# Patient Record
Sex: MALE | Race: WHITE | NOT HISPANIC OR LATINO | Employment: OTHER | URBAN - METROPOLITAN AREA
[De-identification: names, ages, dates, MRNs, and addresses within clinical notes are randomized per-mention and may not be internally consistent; named-entity substitution may affect disease eponyms.]

---

## 2023-08-26 PROBLEM — I25.10 CORONARY ATHEROSCLEROSIS: Status: ACTIVE | Noted: 2023-08-26

## 2023-08-26 PROBLEM — C44.41 BASAL CELL CARCINOMA OF SKIN OF SCALP AND NECK: Status: ACTIVE | Noted: 2019-11-15

## 2023-08-26 PROBLEM — E78.5 HYPERLIPIDEMIA: Status: ACTIVE | Noted: 2023-08-26

## 2023-08-26 PROBLEM — R07.9 CHEST PAIN: Status: ACTIVE | Noted: 2023-08-26

## 2023-08-26 PROBLEM — C44.310 BASAL CELL CARCINOMA OF SKIN OF UNSPECIFIED PARTS OF FACE: Status: ACTIVE | Noted: 2019-11-15

## 2023-08-26 PROBLEM — R93.1 ELEVATED CORONARY ARTERY CALCIUM SCORE: Status: ACTIVE | Noted: 2023-08-26

## 2023-08-26 PROBLEM — N52.9 ERECTILE DYSFUNCTION: Status: ACTIVE | Noted: 2023-08-26

## 2023-08-26 PROBLEM — M72.2 PLANTAR FASCIITIS OF LEFT FOOT: Status: ACTIVE | Noted: 2023-08-26

## 2023-08-26 PROBLEM — D86.9 SARCOIDOSIS: Status: ACTIVE | Noted: 2023-08-26

## 2023-08-26 PROBLEM — B02.29 POSTHERPETIC NEURALGIA: Status: ACTIVE | Noted: 2023-08-26

## 2023-08-26 PROBLEM — I77.810 AORTIC ROOT DILATATION (CMS-HCC): Status: ACTIVE | Noted: 2023-08-26

## 2023-08-26 RX ORDER — PREGABALIN 150 MG/1
1 CAPSULE ORAL 2 TIMES DAILY
COMMUNITY
Start: 2021-09-15 | End: 2023-12-29 | Stop reason: SDUPTHER

## 2023-08-26 RX ORDER — CHOLECALCIFEROL (VITAMIN D3) 25 MCG
1 TABLET ORAL DAILY
COMMUNITY
Start: 2018-06-20

## 2023-08-26 RX ORDER — VALACYCLOVIR HYDROCHLORIDE 1 G/1
1000 TABLET, FILM COATED ORAL 3 TIMES DAILY
COMMUNITY

## 2023-08-26 RX ORDER — MULTIVIT-MIN/IRON/FOLIC ACID/K 18-600-40
2 CAPSULE ORAL
COMMUNITY

## 2023-08-26 RX ORDER — ASPIRIN 81 MG/1
1 TABLET ORAL DAILY
COMMUNITY

## 2023-08-26 RX ORDER — SILDENAFIL 50 MG/1
1 TABLET, FILM COATED ORAL DAILY PRN
COMMUNITY
Start: 2016-03-25

## 2023-08-26 RX ORDER — ROSUVASTATIN CALCIUM 40 MG/1
1 TABLET, COATED ORAL NIGHTLY
COMMUNITY
Start: 2021-07-07 | End: 2023-10-03 | Stop reason: SDUPTHER

## 2023-10-03 DIAGNOSIS — E78.5 HYPERLIPIDEMIA, UNSPECIFIED HYPERLIPIDEMIA TYPE: Primary | ICD-10-CM

## 2023-10-03 RX ORDER — ROSUVASTATIN CALCIUM 40 MG/1
40 TABLET, COATED ORAL NIGHTLY
Qty: 90 TABLET | Refills: 3 | Status: SHIPPED | OUTPATIENT
Start: 2023-10-03

## 2023-10-04 ENCOUNTER — OFFICE VISIT (OUTPATIENT)
Dept: PAIN MEDICINE | Facility: CLINIC | Age: 68
End: 2023-10-04
Payer: MEDICARE

## 2023-10-04 VITALS
BODY MASS INDEX: 25.87 KG/M2 | WEIGHT: 191 LBS | SYSTOLIC BLOOD PRESSURE: 125 MMHG | HEIGHT: 72 IN | TEMPERATURE: 97.6 F | HEART RATE: 51 BPM | DIASTOLIC BLOOD PRESSURE: 72 MMHG | OXYGEN SATURATION: 99 % | RESPIRATION RATE: 16 BRPM

## 2023-10-04 DIAGNOSIS — B02.29 POSTHERPETIC NEURALGIA: Primary | ICD-10-CM

## 2023-10-04 PROCEDURE — 1159F MED LIST DOCD IN RCRD: CPT | Performed by: ANESTHESIOLOGY

## 2023-10-04 PROCEDURE — 99214 OFFICE O/P EST MOD 30 MIN: CPT | Performed by: ANESTHESIOLOGY

## 2023-10-04 PROCEDURE — 1036F TOBACCO NON-USER: CPT | Performed by: ANESTHESIOLOGY

## 2023-10-04 PROCEDURE — 1125F AMNT PAIN NOTED PAIN PRSNT: CPT | Performed by: ANESTHESIOLOGY

## 2023-10-04 RX ORDER — ACYCLOVIR 800 MG/1
800 TABLET ORAL 4 TIMES DAILY
Status: SHIPPED | OUTPATIENT
Start: 2023-10-04 | End: 2023-10-19

## 2023-10-04 ASSESSMENT — PATIENT HEALTH QUESTIONNAIRE - PHQ9
2. FEELING DOWN, DEPRESSED OR HOPELESS: NOT AT ALL
SUM OF ALL RESPONSES TO PHQ9 QUESTIONS 1 AND 2: 0
1. LITTLE INTEREST OR PLEASURE IN DOING THINGS: NOT AT ALL

## 2023-10-04 ASSESSMENT — ENCOUNTER SYMPTOMS
CARDIOVASCULAR NEGATIVE: 1
EYES NEGATIVE: 1
OCCASIONAL FEELINGS OF UNSTEADINESS: 0
DEPRESSION: 0
NEUROLOGICAL NEGATIVE: 1
GASTROINTESTINAL NEGATIVE: 1
RESPIRATORY NEGATIVE: 1
HEMATOLOGIC/LYMPHATIC NEGATIVE: 1
ENDOCRINE NEGATIVE: 1
PSYCHIATRIC NEGATIVE: 1
LOSS OF SENSATION IN FEET: 0

## 2023-10-04 ASSESSMENT — PAIN SCALES - GENERAL
PAINLEVEL_OUTOF10: 4
PAINLEVEL: 4

## 2023-10-04 ASSESSMENT — PAIN DESCRIPTION - DESCRIPTORS: DESCRIPTORS: SHARP;BURNING

## 2023-10-04 ASSESSMENT — COLUMBIA-SUICIDE SEVERITY RATING SCALE - C-SSRS
2. HAVE YOU ACTUALLY HAD ANY THOUGHTS OF KILLING YOURSELF?: NO
1. IN THE PAST MONTH, HAVE YOU WISHED YOU WERE DEAD OR WISHED YOU COULD GO TO SLEEP AND NOT WAKE UP?: NO
6. HAVE YOU EVER DONE ANYTHING, STARTED TO DO ANYTHING, OR PREPARED TO DO ANYTHING TO END YOUR LIFE?: NO

## 2023-10-04 ASSESSMENT — LIFESTYLE VARIABLES: TOTAL SCORE: 0

## 2023-10-04 ASSESSMENT — PAIN - FUNCTIONAL ASSESSMENT: PAIN_FUNCTIONAL_ASSESSMENT: 0-10

## 2023-10-10 DIAGNOSIS — N40.1 BENIGN PROSTATIC HYPERPLASIA WITH URINARY FREQUENCY: ICD-10-CM

## 2023-10-10 DIAGNOSIS — R35.0 BENIGN PROSTATIC HYPERPLASIA WITH URINARY FREQUENCY: ICD-10-CM

## 2023-10-10 DIAGNOSIS — E78.5 HYPERLIPIDEMIA, UNSPECIFIED HYPERLIPIDEMIA TYPE: Primary | ICD-10-CM

## 2023-10-10 DIAGNOSIS — E55.9 VITAMIN D DEFICIENCY: ICD-10-CM

## 2023-11-20 ENCOUNTER — LAB (OUTPATIENT)
Dept: LAB | Facility: LAB | Age: 68
End: 2023-11-20
Payer: MEDICARE

## 2023-11-20 DIAGNOSIS — R35.0 BENIGN PROSTATIC HYPERPLASIA WITH URINARY FREQUENCY: ICD-10-CM

## 2023-11-20 DIAGNOSIS — N40.1 BENIGN PROSTATIC HYPERPLASIA WITH URINARY FREQUENCY: ICD-10-CM

## 2023-11-20 DIAGNOSIS — E55.9 VITAMIN D DEFICIENCY: ICD-10-CM

## 2023-11-20 DIAGNOSIS — E78.5 HYPERLIPIDEMIA, UNSPECIFIED HYPERLIPIDEMIA TYPE: ICD-10-CM

## 2023-11-20 LAB
25(OH)D3 SERPL-MCNC: 32 NG/ML (ref 30–100)
ALBUMIN SERPL BCP-MCNC: 4.2 G/DL (ref 3.4–5)
ALP SERPL-CCNC: 46 U/L (ref 33–136)
ALT SERPL W P-5'-P-CCNC: 16 U/L (ref 10–52)
ANION GAP SERPL CALC-SCNC: 11 MMOL/L (ref 10–20)
APPEARANCE UR: CLEAR
AST SERPL W P-5'-P-CCNC: 18 U/L (ref 9–39)
BILIRUB SERPL-MCNC: 0.7 MG/DL (ref 0–1.2)
BILIRUB UR STRIP.AUTO-MCNC: NEGATIVE MG/DL
BUN SERPL-MCNC: 11 MG/DL (ref 6–23)
CALCIUM SERPL-MCNC: 9.7 MG/DL (ref 8.6–10.6)
CHLORIDE SERPL-SCNC: 105 MMOL/L (ref 98–107)
CHOLEST SERPL-MCNC: 137 MG/DL (ref 0–199)
CHOLESTEROL/HDL RATIO: 2.1
CO2 SERPL-SCNC: 30 MMOL/L (ref 21–32)
COLOR UR: YELLOW
CREAT SERPL-MCNC: 0.84 MG/DL (ref 0.5–1.3)
ERYTHROCYTE [DISTWIDTH] IN BLOOD BY AUTOMATED COUNT: 13.5 % (ref 11.5–14.5)
GFR SERPL CREATININE-BSD FRML MDRD: >90 ML/MIN/1.73M*2
GLUCOSE SERPL-MCNC: 83 MG/DL (ref 74–99)
GLUCOSE UR STRIP.AUTO-MCNC: NEGATIVE MG/DL
HCT VFR BLD AUTO: 42.9 % (ref 41–52)
HDLC SERPL-MCNC: 65.4 MG/DL
HGB BLD-MCNC: 13.9 G/DL (ref 13.5–17.5)
HOLD SPECIMEN: NORMAL
KETONES UR STRIP.AUTO-MCNC: NEGATIVE MG/DL
LDLC SERPL CALC-MCNC: 59 MG/DL
LEUKOCYTE ESTERASE UR QL STRIP.AUTO: NEGATIVE
MCH RBC QN AUTO: 30.1 PG (ref 26–34)
MCHC RBC AUTO-ENTMCNC: 32.4 G/DL (ref 32–36)
MCV RBC AUTO: 93 FL (ref 80–100)
NITRITE UR QL STRIP.AUTO: NEGATIVE
NON HDL CHOLESTEROL: 72 MG/DL (ref 0–149)
NRBC BLD-RTO: 0 /100 WBCS (ref 0–0)
PH UR STRIP.AUTO: 6 [PH]
PLATELET # BLD AUTO: 115 X10*3/UL (ref 150–450)
POTASSIUM SERPL-SCNC: 4.2 MMOL/L (ref 3.5–5.3)
PROT SERPL-MCNC: 6.7 G/DL (ref 6.4–8.2)
PROT UR STRIP.AUTO-MCNC: NEGATIVE MG/DL
PSA SERPL-MCNC: 1.06 NG/ML
RBC # BLD AUTO: 4.62 X10*6/UL (ref 4.5–5.9)
RBC # UR STRIP.AUTO: NEGATIVE /UL
SODIUM SERPL-SCNC: 142 MMOL/L (ref 136–145)
SP GR UR STRIP.AUTO: 1.01
TRIGL SERPL-MCNC: 62 MG/DL (ref 0–149)
TSH SERPL-ACNC: 3.17 MIU/L (ref 0.44–3.98)
UROBILINOGEN UR STRIP.AUTO-MCNC: <2 MG/DL
VLDL: 12 MG/DL (ref 0–40)
WBC # BLD AUTO: 5 X10*3/UL (ref 4.4–11.3)

## 2023-11-20 PROCEDURE — 80061 LIPID PANEL: CPT

## 2023-11-20 PROCEDURE — 84153 ASSAY OF PSA TOTAL: CPT

## 2023-11-20 PROCEDURE — 80053 COMPREHEN METABOLIC PANEL: CPT

## 2023-11-20 PROCEDURE — 81003 URINALYSIS AUTO W/O SCOPE: CPT

## 2023-11-20 PROCEDURE — 84443 ASSAY THYROID STIM HORMONE: CPT

## 2023-11-20 PROCEDURE — 36415 COLL VENOUS BLD VENIPUNCTURE: CPT

## 2023-11-20 PROCEDURE — 85027 COMPLETE CBC AUTOMATED: CPT

## 2023-11-20 PROCEDURE — 82306 VITAMIN D 25 HYDROXY: CPT

## 2023-11-27 ENCOUNTER — OFFICE VISIT (OUTPATIENT)
Dept: GASTROENTEROLOGY | Facility: CLINIC | Age: 68
End: 2023-11-27
Payer: MEDICARE

## 2023-11-27 VITALS — BODY MASS INDEX: 26.55 KG/M2 | HEART RATE: 50 BPM | WEIGHT: 196 LBS | OXYGEN SATURATION: 96 % | HEIGHT: 72 IN

## 2023-11-27 DIAGNOSIS — E78.00 PURE HYPERCHOLESTEROLEMIA: Primary | ICD-10-CM

## 2023-11-27 PROCEDURE — 1036F TOBACCO NON-USER: CPT | Performed by: INTERNAL MEDICINE

## 2023-11-27 PROCEDURE — 1159F MED LIST DOCD IN RCRD: CPT | Performed by: INTERNAL MEDICINE

## 2023-11-27 PROCEDURE — 1125F AMNT PAIN NOTED PAIN PRSNT: CPT | Performed by: INTERNAL MEDICINE

## 2023-11-27 PROCEDURE — 99214 OFFICE O/P EST MOD 30 MIN: CPT | Performed by: INTERNAL MEDICINE

## 2023-11-27 ASSESSMENT — ENCOUNTER SYMPTOMS
MUSCULOSKELETAL NEGATIVE: 1
HEMATOLOGIC/LYMPHATIC NEGATIVE: 1
NEUROLOGICAL NEGATIVE: 1
EYES NEGATIVE: 1
CARDIOVASCULAR NEGATIVE: 1
ENDOCRINE NEGATIVE: 1
CONSTITUTIONAL NEGATIVE: 1
GASTROINTESTINAL NEGATIVE: 1
RESPIRATORY NEGATIVE: 1
PSYCHIATRIC NEGATIVE: 1

## 2023-11-27 NOTE — PROGRESS NOTES
Subjective     History of Present Illness:   Jadiel Waggoner is a 68 y.o. male with PMHx of sarcoidosis  who presents to GI clinic for physical exam.     Review of Systems  Review of Systems   Constitutional: Negative.    HENT: Negative.     Eyes: Negative.  Negative for visual disturbance.   Respiratory: Negative.     Cardiovascular: Negative.    Gastrointestinal: Negative.    Endocrine: Negative.    Genitourinary: Negative.    Musculoskeletal: Negative.    Neurological: Negative.    Hematological: Negative.    Psychiatric/Behavioral: Negative.         Allergies  No Known Allergies    Medications  Current Outpatient Medications   Medication Instructions    ascorbic acid, vitamin C, 500 mg capsule 2 capsules, oral, Daily RT    aspirin 81 mg EC tablet 1 tablet, oral, Daily    cholecalciferol (Vitamin D-3) 25 MCG (1000 UT) tablet 1 tablet, oral, Daily    pregabalin (Lyrica) 150 mg capsule 1 capsule, oral, 2 times daily    rosuvastatin (CRESTOR) 40 mg, oral, Nightly    sildenafil (Viagra) 50 mg tablet 1 tablet, oral, Daily PRN, 1 HOUR BEFORE NEEDED    valACYclovir (VALTREX) 1,000 mg, oral, 3 times daily        Objective   Visit Vitals  Pulse 50    /74  Physical Exam  Constitutional:       Appearance: Normal appearance.   HENT:      Right Ear: Tympanic membrane normal.      Left Ear: Tympanic membrane normal.      Nose: Nose normal.      Mouth/Throat:      Mouth: Mucous membranes are moist.   Eyes:      Extraocular Movements: Extraocular movements intact.   Cardiovascular:      Rate and Rhythm: Normal rate and regular rhythm.      Pulses: Normal pulses.   Pulmonary:      Effort: Pulmonary effort is normal.      Breath sounds: Normal breath sounds.   Abdominal:      General: Abdomen is flat.      Palpations: Abdomen is soft.   Genitourinary:     Testes: Normal.      Prostate: Normal.      Rectum: Normal. Guaiac result negative.   Musculoskeletal:      Cervical back: Normal range of motion.   Neurological:       General: No focal deficit present.   Psychiatric:         Mood and Affect: Mood normal.           Lab Results   Component Value Date    WBC 5.0 11/20/2023    WBC 6.9 07/12/2022    WBC 5.1 11/10/2021    HGB 13.9 11/20/2023    HGB 15.1 07/12/2022    HGB 14.2 11/10/2021    HCT 42.9 11/20/2023    HCT 45.2 07/12/2022    HCT 44.1 11/10/2021     (L) 11/20/2023     (L) 07/12/2022     (L) 11/10/2021     Lab Results   Component Value Date     11/20/2023     11/28/2022     07/12/2022    K 4.2 11/20/2023    K 4.3 11/28/2022    K 4.0 07/12/2022     11/20/2023     11/28/2022     07/12/2022    CO2 30 11/20/2023    CO2 29 11/28/2022    CO2 25 07/12/2022    BUN 11 11/20/2023    BUN 13 11/28/2022    BUN 10 07/12/2022    CREATININE 0.84 11/20/2023    CREATININE 0.85 11/28/2022    CREATININE 0.82 07/12/2022    CALCIUM 9.7 11/20/2023    CALCIUM 9.7 11/28/2022    CALCIUM 9.6 07/12/2022    PROT 6.7 11/20/2023    PROT 6.8 11/28/2022    PROT 7.3 07/12/2022    BILITOT 0.7 11/20/2023    BILITOT 0.7 11/28/2022    BILITOT 0.5 07/12/2022    ALKPHOS 46 11/20/2023    ALKPHOS 55 11/28/2022    ALKPHOS 58 07/12/2022    ALT 16 11/20/2023    ALT 14 11/28/2022    ALT 14 07/12/2022    AST 18 11/20/2023    AST 19 11/28/2022    AST 20 07/12/2022    GLUCOSE 83 11/20/2023    GLUCOSE 86 11/28/2022    GLUCOSE 93 07/12/2022    CHOL 137 11/20/2023    CHOL 130 07/12/2022    CHOL 130 11/10/2021    LDLF 58 07/12/2022    LDLF 58 11/10/2021    LDLF 71 07/02/2021    CHHDL 2.1 11/20/2023    CHHDL 2.2 07/12/2022    CHHDL 2.2 11/10/2021     Electrocardiogram 12 Lead  Marked sinus bradycardia  Abnormal ECG  When compared with ECG of 13-JUL-2022 08:42,  No significant change was found  Confirmed by Lai Pérez ([id number]) on 8/15/2023 10:08:54 PM           Jadiel Waggoner is a 68 y.o. male for physical exam.   Overall doing very well.   Feels well, has active lifestyle, exercises regularly.   BP, glucose, lipids, PSA at  target levels.   Encouraged continued exercise program.   Follow up with Albertina Vazquez) for skin exams.      Discussed value of statins in reducing cardiac events.   He will discuss also with Dr. Pérez.       Jabari Jain MD

## 2023-12-29 DIAGNOSIS — B02.29 POSTHERPETIC NEURALGIA: Primary | ICD-10-CM

## 2023-12-29 RX ORDER — PREGABALIN 150 MG/1
150 CAPSULE ORAL 2 TIMES DAILY
Qty: 270 CAPSULE | Refills: 0 | Status: SHIPPED | OUTPATIENT
Start: 2023-12-29

## 2024-01-03 ENCOUNTER — APPOINTMENT (OUTPATIENT)
Dept: PAIN MEDICINE | Facility: CLINIC | Age: 69
End: 2024-01-03
Payer: MEDICARE

## 2024-01-15 NOTE — PROGRESS NOTES
SUBJECTIVE:  This is 68 y.o.  male with PMH of {kt past medical history:01728},  who is here for follow-up ***      Prior office visit:  10/4/2023: This is 68 y.o.  male with who has been treated for  Postherpetic neuralgia of the right chest and armpit postherpetic neuralgia of the right shoulder and armpit and chest wall Usually stable on pregabalin.  The patient recently had a new rash over his shoulder of shingles and he took old acyclovir that I have for him and immediately disappeared.  I will order new acyclovir for him and I recommended that he has to contact his PCP or just get the new shingles vaccine which she never took.. Pain is better, The pain is described as electrical, searing, sharp, and soreness and is relieved by Medications Pregabalin .          Last procedure:   L5-S1 lumbar transforaminal EMILY for radiculopathy long time ago at [facility]     Portions of record reviewed for pertinent issues: active problem list, medication list, allergies, family history, social history, notes from last encounter, encounters, lab results, imaging and other system records.           I have personally reviewed the OARRS report for this patient. This report is scanned into the electronic medical record. I have considered the risks of abuse, dependence, addiction and diversion. It showed pregabalin 150 mg twice daily  OPIOID RISK ASSESSMENT SCORE: 0/26  Opioid agreement: 1/4/2023  Activities of daily living: Very active and no side effects from the medication  Adverse effects: None  Analgesia: W/O 8/10, W 0/10  Toxicology screen: 1/4/2023  Aberrant behavior: None  Patient is being treated with pregabalin therapy for pain and is responding appropriately.  There are NO signs of opioid intoxication, abuse, addiction or withdrawal. Pupils are equal, reactive to light bilateral, appropriate speech and cognition. Patient denies any opioid induced constipation. The OARRS registry followed periodically, urine toxicology  completed and appropriate.      Patient is advised and warned in specific detail about potential benefits of opioids along with risks and side effects including, but not limited to, dependency, addiction, tolerance, hyperalgesia, anxiety, depression, insomnia, endocrine changes, immunologic disturbances, respiratory depression and death.      Caution advised regarding the use of medications prescribed at this office and specific mention made regarding not to drive or operate heavy machinery if feeling side effects from this the medications. Patient expressed an understanding in regards to these particular concerns.      Discussed non-opioid options including (But no limited), physical wellness, antiinflammatory diet, icing and heating, meditation, relaxation, massage and acupuncture.     We will continue to monitor and adjust medications as needed.                 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 We discussed exercises, medication, interventional therapies and surgery. Healthy life style is essential with patient hard work to achieve the wellness. In addition; discussion with the patient and/or family about any of the diagnostic results, impressions and/or recommended diagnostic studies, prognosis, risks and benefits of treatment options, instructions for treatment and/or follow-up, importance of compliance with chosen treatment options, risk-factor reduction, and patient/family education.         Pool therapy, walking in the pool, at least 3x per week for 30 minutes  Continue self-directed physical therapy  *** Smoking cessation  Healthy lifestyle and anti-inflammatory diet in  addition to weight control discussed with the patient  Alternative chronic pain therapies was discussed, encouraged and information was handed  Return to Clinic ***     *Please note this report has been produced using speech recognition software and may contain errors related to that system including grammar, punctuation and spelling as well as words and phrases that may be inappropriate. If there are questions or concerns, please feel free to contact me to clarify.    Bharati Brannon MD

## 2024-01-17 ENCOUNTER — APPOINTMENT (OUTPATIENT)
Dept: PAIN MEDICINE | Facility: CLINIC | Age: 69
End: 2024-01-17
Payer: MEDICARE

## 2024-01-22 NOTE — PROGRESS NOTES
SUBJECTIVE:  This is 68 y.o.  male with PMH of Postherpetic neuralgia of the right chest and armpit postherpetic neuralgia usually stable on pregabalin.  The patient recently had a new rash over his shoulder of shingles and he took old acyclovir that I have for him and immediately disappeared.  I gave him the refill on acyclovir to have it at home to use it immediately in the case of rash.  The patient is here for follow-up stating that he has not had any more new rash of shingles and he still have acyclovir at home.  He is stable on 150 mg twice daily of pregabalin and I recommended to take 2 Advil's and 2 acetaminophen 1000 mg if the pain started getting bad for those episodic times.  Will continue refill for 3 months at the time      Prior office visit:  10/4/2023: This is 68 y.o.  male with who has been treated for  Postherpetic neuralgia of the right chest and armpit postherpetic neuralgia of the right shoulder and armpit and chest wall Usually stable on pregabalin.  The patient recently had a new rash over his shoulder of shingles and he took old acyclovir that I have for him and immediately disappeared.  I will order new acyclovir for him and I recommended that he has to contact his PCP or just get the new shingles vaccine which she never took.. Pain is better, The pain is described as electrical, searing, sharp, and soreness and is relieved by Medications Pregabalin .           Last procedure:   L5-S1 lumbar transforaminal EMILY for radiculopathy long time ago at [facility]     Portions of record reviewed for pertinent issues: active problem list, medication list, allergies, family history, social history, notes from last encounter, encounters, lab results, imaging and other system records.           I have personally reviewed the OARRS report for this patient. This report is scanned into the electronic medical record. I have considered the risks of abuse, dependence, addiction and diversion. It showed  pregabalin 150 mg twice daily  OPIOID RISK ASSESSMENT SCORE: 0/26  Opioid agreement: 1/4/2023  Activities of daily living: Very active and no side effects from the medication  Adverse effects: None  Analgesia: W/O 8/10, W 0/10  Toxicology screen: 1/4/2023  Aberrant behavior: None  Patient is being treated with pregabalin therapy for pain and is responding appropriately.  There are NO signs of opioid intoxication, abuse, addiction or withdrawal. Pupils are equal, reactive to light bilateral, appropriate speech and cognition. Patient denies any opioid induced constipation. The OARRS registry followed periodically, urine toxicology completed and appropriate.      Patient is advised and warned in specific detail about potential benefits of opioids along with risks and side effects including, but not limited to, dependency, addiction, tolerance, hyperalgesia, anxiety, depression, insomnia, endocrine changes, immunologic disturbances, respiratory depression and death.      Caution advised regarding the use of medications prescribed at this office and specific mention made regarding not to drive or operate heavy machinery if feeling side effects from this the medications. Patient expressed an understanding in regards to these particular concerns.      Discussed non-opioid options including (But no limited), physical wellness, antiinflammatory diet, icing and heating, meditation, relaxation, massage and acupuncture.     We will continue to monitor and adjust medications as needed.                 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     Eyes: Negative.    Respiratory: Negative.     Cardiovascular: Negative.    Gastrointestinal: Negative.    Endocrine: Negative.    Genitourinary: Negative.     Musculoskeletal:  Positive for arthralgias and myalgias.   Skin: Negative.    Hematological: Negative.    Psychiatric/Behavioral: Negative.          Physical Exam  Vitals and nursing note reviewed.   Constitutional:       Appearance: Normal appearance.       HENT:      Head: Normocephalic and atraumatic.      Nose: Nose normal.   Eyes:      Extraocular Movements: Extraocular movements intact.      Conjunctiva/sclera: Conjunctivae normal.      Pupils: Pupils are equal, round, and reactive to light.   Cardiovascular:      Rate and Rhythm: Normal rate and regular rhythm.      Pulses: Normal pulses.      Heart sounds: Normal heart sounds.   Pulmonary:      Effort: Pulmonary effort is normal.      Breath sounds: Normal breath sounds.   Abdominal:      General: Abdomen is flat. Bowel sounds are normal.      Palpations: Abdomen is soft.   Musculoskeletal:         General: Tenderness present.   Skin:     General: Skin is warm.   Neurological:      General: No focal deficit present.      Mental Status: He is alert and oriented to person, place, and time.   Psychiatric:         Mood and Affect: Mood normal.         Behavior: Behavior normal.                      Plan  At least 50% of the visit was involved in the discussion of the options for treatment. We discussed exercises, medication, interventional therapies and surgery. Healthy life style is essential with patient hard work to achieve the wellness. In addition; discussion with the patient and/or family about any of the diagnostic results, impressions and/or recommended diagnostic studies, prognosis, risks and benefits of treatment options, instructions for treatment and/or follow-up, importance of compliance with chosen treatment options, risk-factor reduction, and patient/family education.         Pool therapy, walking in the pool, at least 3x per week for 30 minutes  Continue self-directed physical therapy  Continue pregabalin 150 mg twice daily x 3 months  Healthy  lifestyle and anti-inflammatory diet in addition to weight control discussed with the patient  Alternative chronic pain therapies was discussed, encouraged and information was handed  Return to Clinic 3 months     *Please note this report has been produced using speech recognition software and may contain errors related to that system including grammar, punctuation and spelling as well as words and phrases that may be inappropriate. If there are questions or concerns, please feel free to contact me to clarify.    Bharati Brannon MD

## 2024-01-24 ENCOUNTER — OFFICE VISIT (OUTPATIENT)
Dept: PAIN MEDICINE | Facility: CLINIC | Age: 69
End: 2024-01-24
Payer: MEDICARE

## 2024-01-24 VITALS
SYSTOLIC BLOOD PRESSURE: 121 MMHG | HEIGHT: 72 IN | TEMPERATURE: 97.9 F | BODY MASS INDEX: 26.55 KG/M2 | WEIGHT: 196 LBS | DIASTOLIC BLOOD PRESSURE: 74 MMHG | RESPIRATION RATE: 16 BRPM | HEART RATE: 59 BPM

## 2024-01-24 DIAGNOSIS — B02.29 POSTHERPETIC NEURALGIA: Primary | ICD-10-CM

## 2024-01-24 PROCEDURE — 99214 OFFICE O/P EST MOD 30 MIN: CPT | Performed by: ANESTHESIOLOGY

## 2024-01-24 PROCEDURE — 1036F TOBACCO NON-USER: CPT | Performed by: ANESTHESIOLOGY

## 2024-01-24 PROCEDURE — 1125F AMNT PAIN NOTED PAIN PRSNT: CPT | Performed by: ANESTHESIOLOGY

## 2024-01-24 SDOH — ECONOMIC STABILITY: FOOD INSECURITY: WITHIN THE PAST 12 MONTHS, YOU WORRIED THAT YOUR FOOD WOULD RUN OUT BEFORE YOU GOT MONEY TO BUY MORE.: NEVER TRUE

## 2024-01-24 SDOH — ECONOMIC STABILITY: FOOD INSECURITY: WITHIN THE PAST 12 MONTHS, THE FOOD YOU BOUGHT JUST DIDN'T LAST AND YOU DIDN'T HAVE MONEY TO GET MORE.: NEVER TRUE

## 2024-01-24 ASSESSMENT — ENCOUNTER SYMPTOMS
CARDIOVASCULAR NEGATIVE: 1
ENDOCRINE NEGATIVE: 1
GASTROINTESTINAL NEGATIVE: 1
RESPIRATORY NEGATIVE: 1
ARTHRALGIAS: 1
HEMATOLOGIC/LYMPHATIC NEGATIVE: 1
EYES NEGATIVE: 1
PSYCHIATRIC NEGATIVE: 1
MYALGIAS: 1

## 2024-01-24 ASSESSMENT — PAIN SCALES - GENERAL
PAINLEVEL_OUTOF10: 3
PAINLEVEL: 3

## 2024-01-24 ASSESSMENT — PAIN - FUNCTIONAL ASSESSMENT: PAIN_FUNCTIONAL_ASSESSMENT: 0-10

## 2024-01-24 ASSESSMENT — PAIN DESCRIPTION - DESCRIPTORS: DESCRIPTORS: SORE;SHARP;OTHER (COMMENT)

## 2024-01-24 NOTE — PROGRESS NOTES
This is 68 y.o.  male with who has been treated for  right chest and armpit postherpetic neuralgia usually stable on pregabalin . Pain is  stable , The pain is described as  electrical, searing, sharp, and soreness and is relieved by medication.  Here for follow-up   Chief Complaint   Patient presents with    Follow-up    Med Management    Med Refill       Pain Therapies:  medications

## 2024-03-29 DIAGNOSIS — B02.29 POSTHERPETIC NEURALGIA: Primary | ICD-10-CM

## 2024-03-29 RX ORDER — PREGABALIN 150 MG/1
150 CAPSULE ORAL 2 TIMES DAILY
Qty: 180 CAPSULE | Refills: 0 | Status: SHIPPED | OUTPATIENT
Start: 2024-03-29 | End: 2024-04-01 | Stop reason: SDUPTHER

## 2024-04-01 DIAGNOSIS — B02.29 POSTHERPETIC NEURALGIA: ICD-10-CM

## 2024-04-01 RX ORDER — PREGABALIN 150 MG/1
150 CAPSULE ORAL 2 TIMES DAILY
Qty: 180 CAPSULE | Refills: 0 | Status: SHIPPED | OUTPATIENT
Start: 2024-04-01 | End: 2024-06-30

## 2024-04-01 RX ORDER — PREGABALIN 150 MG/1
150 CAPSULE ORAL 2 TIMES DAILY
Qty: 180 CAPSULE | Refills: 0 | Status: SHIPPED | OUTPATIENT
Start: 2024-04-01 | End: 2024-04-01 | Stop reason: SDUPTHER

## 2024-04-01 NOTE — PROGRESS NOTES
Start Date: 03/29/24 End Date: 06/27/24 after 180 doses   Written Date: 03/29/24 Rx Expiration Date: 03/29/25        Associated Diagnoses: Postherpetic neuralgia [B02.29]   Providers    Ordering and Authorizing Provider: Bharati Brannon MD NPI: [id number]   SHEILA #: [id number]   Ordering User: Bharati Brannon MD          Pharmacy    [facility]/pharmacy #[id number] [place] [id number]  Phone: [phone]  Fax: [phone]  SHEILA #: [id number]       Orders Placed This Encounter      pregabalin (Lyrica) 150 mg capsule    The [facility] did not have the medication and I sent another prescription to [facility] on [facility]  Orders Placed This Encounter      pregabalin (Lyrica) 150 mg capsule

## 2024-04-30 NOTE — PROGRESS NOTES
SUBJECTIVE:  This is 68 y.o.  male with PMH of Postherpetic neuralgia of the right chest and armpit postherpetic neuralgia usually stable on pregabalin.  The patient recently had a new rash over his shoulder of shingles and he took old acyclovir that I have for him and immediately disappeared.  I gave him the refill on acyclovir to have it at home to use it immediately in the case of rash.  The patient is stable on his pregabalin 150 mg twice daily with excellent control of his symptoms who is here for follow-up who stated that unfortunately when he went to  his prescription before his trip to [place] the pharmacy wanted approval from physician and they did not have it they are available so I had to send it to a different pharmacy than that he eventually got his prescription and he is okay regarding that.  I explained to him to try always get his prescription around 10 days before the expiration date since the pharmacy may not carry the medication on time for him.  The patient still doing very well with his medication without any side effect and he does not have any new rash.      Prior office visit:  1/24/2024: This is 68 y.o.  male with PMH of Postherpetic neuralgia of the right chest and armpit postherpetic neuralgia usually stable on pregabalin.  The patient recently had a new rash over his shoulder of shingles and he took old acyclovir that I have for him and immediately disappeared.  I gave him the refill on acyclovir to have it at home to use it immediately in the case of rash.  The patient is here for follow-up stating that he has not had any more new rash of shingles and he still have acyclovir at home.  He is stable on 150 mg twice daily of pregabalin and I recommended to take 2 Advil's and 2 acetaminophen 1000 mg if the pain started getting bad for those episodic times.  Will continue refill for 3 months at the time           Last procedure:   L5-S1 lumbar transforaminal EMILY for radiculopathy long  time ago at [facility]     Portions of record reviewed for pertinent issues: active problem list, medication list, allergies, family history, social history, notes from last encounter, encounters, lab results, imaging and other system records.           I have personally reviewed the OARRS report for this patient. This report is scanned into the electronic medical record. I have considered the risks of abuse, dependence, addiction and diversion. It showed pregabalin 150 mg twice daily  OPIOID RISK ASSESSMENT SCORE: 0/26  Opioid agreement: 5/1/2024  Activities of daily living: Very active and no side effects from the medication  Adverse effects: None  Analgesia: W/O 8/10, W 0/10  Toxicology screen: 5/1/2024  Aberrant behavior: None  Patient is being treated with pregabalin therapy for pain and is responding appropriately.  There are NO signs of opioid intoxication, abuse, addiction or withdrawal. Pupils are equal, reactive to light bilateral, appropriate speech and cognition. Patient denies any opioid induced constipation. The OARRS registry followed periodically, urine toxicology completed and appropriate.      Patient is advised and warned in specific detail about potential benefits of opioids along with risks and side effects including, but not limited to, dependency, addiction, tolerance, hyperalgesia, anxiety, depression, insomnia, endocrine changes, immunologic disturbances, respiratory depression and death.      Caution advised regarding the use of medications prescribed at this office and specific mention made regarding not to drive or operate heavy machinery if feeling side effects from this the medications. Patient expressed an understanding in regards to these particular concerns.      Discussed non-opioid options including (But no limited), physical wellness, antiinflammatory diet, icing and heating, meditation, relaxation, massage and acupuncture.     We will continue to monitor and adjust medications  as needed.                 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     Eyes: Negative.    Respiratory: Negative.     Cardiovascular: Negative.    Gastrointestinal: Negative.    Endocrine: Negative.    Genitourinary: Negative.    Musculoskeletal:  Positive for arthralgias and myalgias.   Skin: Negative.    Neurological: Negative.    Hematological: Negative.    Psychiatric/Behavioral: Negative.          Physical Exam  Vitals and nursing note reviewed.   Constitutional:       Appearance: Normal appearance.   HENT:      Head: Normocephalic and atraumatic.      Nose: Nose normal.   Eyes:      Extraocular Movements: Extraocular movements intact.      Conjunctiva/sclera: Conjunctivae normal.      Pupils: Pupils are equal, round, and reactive to light.   Cardiovascular:      Rate and Rhythm: Normal rate and regular rhythm.      Pulses: Normal pulses.      Heart sounds: Normal heart sounds.   Pulmonary:      Effort: Pulmonary effort is normal.      Breath sounds: Normal breath sounds.   Abdominal:      General: Abdomen is flat. Bowel sounds are normal.      Palpations: Abdomen is soft.   Musculoskeletal:         General: Tenderness present.   Skin:     General: Skin is warm.   Neurological:      General: No focal deficit present.      Mental Status: He is alert and oriented to person, place, and time.   Psychiatric:         Mood and Affect: Mood normal.         Behavior: Behavior normal.                      Plan  At least 50% of the visit was involved in the discussion of the options for treatment. We discussed exercises, medication, interventional therapies and surgery. Healthy life style is essential with patient hard work to achieve the wellness. In addition; discussion with the patient and/or  family about any of the diagnostic results, impressions and/or recommended diagnostic studies, prognosis, risks and benefits of treatment options, instructions for treatment and/or follow-up, importance of compliance with chosen treatment options, risk-factor reduction, and patient/family education.         Pool therapy, walking in the pool, at least 3x per week for 30 minutes  Continue self-directed physical therapy  Continue pregabalin 150 mg twice daily x 90 days  Healthy lifestyle and anti-inflammatory diet in addition to weight control discussed with the patient  Alternative chronic pain therapies was discussed, encouraged and information was handed  Return to Clinic 3 months     *Please note this report has been produced using speech recognition software and may contain errors related to that system including grammar, punctuation and spelling as well as words and phrases that may be inappropriate. If there are questions or concerns, please feel free to contact me to clarify.    Bharati Brannon MD

## 2024-05-01 ENCOUNTER — OFFICE VISIT (OUTPATIENT)
Dept: PAIN MEDICINE | Facility: CLINIC | Age: 69
End: 2024-05-01
Payer: MEDICARE

## 2024-05-01 VITALS
OXYGEN SATURATION: 99 % | HEART RATE: 54 BPM | RESPIRATION RATE: 16 BRPM | DIASTOLIC BLOOD PRESSURE: 62 MMHG | BODY MASS INDEX: 26.55 KG/M2 | TEMPERATURE: 97.7 F | SYSTOLIC BLOOD PRESSURE: 108 MMHG | WEIGHT: 196 LBS | HEIGHT: 72 IN

## 2024-05-01 DIAGNOSIS — Z79.891 LONG TERM (CURRENT) USE OF OPIATE ANALGESIC: Primary | ICD-10-CM

## 2024-05-01 PROCEDURE — 99214 OFFICE O/P EST MOD 30 MIN: CPT | Performed by: ANESTHESIOLOGY

## 2024-05-01 PROCEDURE — 1036F TOBACCO NON-USER: CPT | Performed by: ANESTHESIOLOGY

## 2024-05-01 PROCEDURE — 1125F AMNT PAIN NOTED PAIN PRSNT: CPT | Performed by: ANESTHESIOLOGY

## 2024-05-01 PROCEDURE — 1159F MED LIST DOCD IN RCRD: CPT | Performed by: ANESTHESIOLOGY

## 2024-05-01 PROCEDURE — 80366 DRUG SCREENING PREGABALIN: CPT | Performed by: ANESTHESIOLOGY

## 2024-05-01 ASSESSMENT — PAIN DESCRIPTION - DESCRIPTORS: DESCRIPTORS: BURNING

## 2024-05-01 ASSESSMENT — ENCOUNTER SYMPTOMS
ARTHRALGIAS: 1
OCCASIONAL FEELINGS OF UNSTEADINESS: 0
EYES NEGATIVE: 1
NEUROLOGICAL NEGATIVE: 1
DEPRESSION: 0
PSYCHIATRIC NEGATIVE: 1
GASTROINTESTINAL NEGATIVE: 1
LOSS OF SENSATION IN FEET: 0
ENDOCRINE NEGATIVE: 1
HEMATOLOGIC/LYMPHATIC NEGATIVE: 1
CARDIOVASCULAR NEGATIVE: 1
RESPIRATORY NEGATIVE: 1
MYALGIAS: 1

## 2024-05-01 ASSESSMENT — PAIN - FUNCTIONAL ASSESSMENT: PAIN_FUNCTIONAL_ASSESSMENT: 0-10

## 2024-05-01 ASSESSMENT — PAIN SCALES - GENERAL
PAINLEVEL_OUTOF10: 4
PAINLEVEL: 4

## 2024-05-03 ENCOUNTER — TELEPHONE (OUTPATIENT)
Dept: PAIN MEDICINE | Facility: CLINIC | Age: 69
End: 2024-05-03
Payer: MEDICARE

## 2024-05-03 NOTE — TELEPHONE ENCOUNTER
Lab called and the specimen collected on May 1 for the patient has been denied because it was collected in the wrong container (green).  They stated it has to be in a steriel container.

## 2024-06-28 DIAGNOSIS — B02.29 POSTHERPETIC NEURALGIA: ICD-10-CM

## 2024-06-29 RX ORDER — PREGABALIN 150 MG/1
150 CAPSULE ORAL 2 TIMES DAILY
Qty: 180 CAPSULE | Refills: 0 | Status: SHIPPED | OUTPATIENT
Start: 2024-06-29 | End: 2024-09-27

## 2024-07-16 ENCOUNTER — LAB (OUTPATIENT)
Dept: LAB | Facility: LAB | Age: 69
End: 2024-07-16
Payer: MEDICARE

## 2024-07-16 DIAGNOSIS — I25.10 ATHEROSCLEROTIC HEART DISEASE OF NATIVE CORONARY ARTERY WITHOUT ANGINA PECTORIS: Primary | ICD-10-CM

## 2024-07-16 LAB
CHOLEST SERPL-MCNC: 138 MG/DL (ref 0–199)
CHOLESTEROL/HDL RATIO: 2
HDLC SERPL-MCNC: 68.9 MG/DL
LDLC SERPL CALC-MCNC: 57 MG/DL
NON HDL CHOLESTEROL: 69 MG/DL (ref 0–149)
TRIGL SERPL-MCNC: 61 MG/DL (ref 0–149)
VLDL: 12 MG/DL (ref 0–40)

## 2024-07-16 PROCEDURE — 80061 LIPID PANEL: CPT

## 2024-07-16 PROCEDURE — 36415 COLL VENOUS BLD VENIPUNCTURE: CPT

## 2024-07-17 ENCOUNTER — HOSPITAL ENCOUNTER (OUTPATIENT)
Dept: CARDIOLOGY | Facility: HOSPITAL | Age: 69
Discharge: HOME | End: 2024-07-17
Payer: MEDICARE

## 2024-07-17 ENCOUNTER — OFFICE VISIT (OUTPATIENT)
Dept: CARDIOLOGY | Facility: HOSPITAL | Age: 69
End: 2024-07-17
Payer: MEDICARE

## 2024-07-17 VITALS — WEIGHT: 196 LBS | HEIGHT: 72 IN | BODY MASS INDEX: 26.55 KG/M2

## 2024-07-17 VITALS
DIASTOLIC BLOOD PRESSURE: 61 MMHG | SYSTOLIC BLOOD PRESSURE: 118 MMHG | HEART RATE: 49 BPM | HEIGHT: 72 IN | WEIGHT: 194 LBS | BODY MASS INDEX: 26.28 KG/M2

## 2024-07-17 DIAGNOSIS — I25.10 ATHEROSCLEROSIS OF NATIVE CORONARY ARTERY OF NATIVE HEART WITHOUT ANGINA PECTORIS: Primary | ICD-10-CM

## 2024-07-17 DIAGNOSIS — I77.810 AORTIC ROOT DILATATION (CMS-HCC): ICD-10-CM

## 2024-07-17 DIAGNOSIS — I77.810 THORACIC AORTIC ECTASIA (CMS-HCC): ICD-10-CM

## 2024-07-17 DIAGNOSIS — E78.00 PURE HYPERCHOLESTEROLEMIA: ICD-10-CM

## 2024-07-17 DIAGNOSIS — I25.10 ATHEROSCLEROTIC HEART DISEASE OF NATIVE CORONARY ARTERY WITHOUT ANGINA PECTORIS: ICD-10-CM

## 2024-07-17 DIAGNOSIS — Z87.891 FORMER SMOKER: ICD-10-CM

## 2024-07-17 LAB
AORTIC VALVE MEAN GRADIENT: 3.7 MMHG
AORTIC VALVE PEAK VELOCITY: 1.25 M/S
ATRIAL RATE: 49 BPM
AV PEAK GRADIENT: 6.3 MMHG
AVA (PEAK VEL): 3.24 CM2
AVA (VTI): 2.88 CM2
EJECTION FRACTION APICAL 4 CHAMBER: 62.9
EJECTION FRACTION: 61 %
LEFT ATRIUM VOLUME AREA LENGTH INDEX BSA: 24.1 ML/M2
LEFT VENTRICLE INTERNAL DIMENSION DIASTOLE: 4.52 CM (ref 3.5–6)
LEFT VENTRICULAR OUTFLOW TRACT DIAMETER: 2.11 CM
MITRAL VALVE E/A RATIO: 1.36
P AXIS: 64 DEGREES
P OFFSET: 186 MS
P ONSET: 131 MS
PR INTERVAL: 180 MS
Q ONSET: 221 MS
QRS COUNT: 8 BEATS
QRS DURATION: 102 MS
QT INTERVAL: 414 MS
QTC CALCULATION(BAZETT): 373 MS
QTC FREDERICIA: 387 MS
R AXIS: 31 DEGREES
RIGHT VENTRICLE FREE WALL PEAK S': 12 CM/S
RIGHT VENTRICLE PEAK SYSTOLIC PRESSURE: 18.6 MMHG
T AXIS: 52 DEGREES
T OFFSET: 428 MS
TRICUSPID ANNULAR PLANE SYSTOLIC EXCURSION: 1.9 CM
VENTRICULAR RATE: 49 BPM

## 2024-07-17 PROCEDURE — 1036F TOBACCO NON-USER: CPT | Performed by: INTERNAL MEDICINE

## 2024-07-17 PROCEDURE — G2211 COMPLEX E/M VISIT ADD ON: HCPCS | Performed by: INTERNAL MEDICINE

## 2024-07-17 PROCEDURE — 93306 TTE W/DOPPLER COMPLETE: CPT | Performed by: INTERNAL MEDICINE

## 2024-07-17 PROCEDURE — 1159F MED LIST DOCD IN RCRD: CPT | Performed by: INTERNAL MEDICINE

## 2024-07-17 PROCEDURE — 3008F BODY MASS INDEX DOCD: CPT | Performed by: INTERNAL MEDICINE

## 2024-07-17 PROCEDURE — 1160F RVW MEDS BY RX/DR IN RCRD: CPT | Performed by: INTERNAL MEDICINE

## 2024-07-17 PROCEDURE — 99214 OFFICE O/P EST MOD 30 MIN: CPT | Performed by: INTERNAL MEDICINE

## 2024-07-17 PROCEDURE — 93306 TTE W/DOPPLER COMPLETE: CPT

## 2024-07-17 PROCEDURE — 93005 ELECTROCARDIOGRAM TRACING: CPT | Performed by: INTERNAL MEDICINE

## 2024-07-17 PROCEDURE — 99214 OFFICE O/P EST MOD 30 MIN: CPT | Mod: 25 | Performed by: INTERNAL MEDICINE

## 2024-07-17 RX ORDER — EZETIMIBE 10 MG/1
10 TABLET ORAL DAILY
Qty: 90 TABLET | Refills: 3 | Status: SHIPPED | OUTPATIENT
Start: 2024-07-17 | End: 2025-07-17

## 2024-07-17 ASSESSMENT — ENCOUNTER SYMPTOMS
LOSS OF SENSATION IN FEET: 0
OCCASIONAL FEELINGS OF UNSTEADINESS: 0
DEPRESSION: 0

## 2024-07-17 NOTE — PROGRESS NOTES
Primary Care Physician: Jabari Jain MD  Date of Visit: 07/17/2024 10:00 AM EDT  Location of visit: [facility]     Chief Complaint:   Chief Complaint   Patient presents with    Follow-up     ECHO completed this visit        HPI / Summary:   Jadiel Waggoner is a 68 y.o. male presents for followup.  He is asymptomatic from a cardiac perspective.  He exercises with a  4 days a week in addition to walking 2 to 3 days a week and playing golf without chest pain or shortness of breath.  The patient denies chest pain, shortness of breath, palpitations, lightheadedness, syncope, orthopnea, paroxysmal nocturnal dyspnea, lower extremity edema, or bleeding problems.          No past medical history on file.     Past Surgical History:   Procedure Laterality Date    CT ANGIO CORONARY ART WITH HEARTFLOW IF SCORE >30%  10/26/2017    CT HEART CORONARY ANGIOGRAM 10/26/2017 [facility]    OTHER SURGICAL HISTORY  09/15/2021    Hernia repair          Social History:   reports that he quit smoking about 27 years ago. His smoking use included cigarettes and cigars. He started smoking about 47 years ago. He has never used smokeless tobacco. He reports that he does not currently use alcohol. He reports that he does not use drugs.     Family History:  family history is not on file.      Allergies:  No Known Allergies    Outpatient Medications:  Current Outpatient Medications   Medication Instructions    ascorbic acid, vitamin C, 500 mg capsule 2 capsules, oral, Daily RT    aspirin 81 mg EC tablet 1 tablet, oral, Daily    cholecalciferol (Vitamin D-3) 25 MCG (1000 UT) tablet 1 tablet, oral, Daily    pregabalin (LYRICA) 150 mg, oral, 2 times daily, Please refill today    rosuvastatin (CRESTOR) 40 mg, oral, Nightly    sildenafil (Viagra) 50 mg tablet 1 tablet, oral, Daily PRN, 1 HOUR BEFORE NEEDED    valACYclovir (VALTREX) 1,000 mg, oral, 3 times daily       Physical Exam:  Vitals:    07/17/24 0926   BP: 118/61   BP  "Location: Left arm   Patient Position: Sitting   Pulse: (!) 49   Weight: 88 kg (194 lb)   Height: 1.829 m (6')     Wt Readings from Last 5 Encounters:   07/17/24 88 kg (194 lb)   07/17/24 88.9 kg (196 lb)   05/01/24 88.9 kg (196 lb)   01/24/24 88.9 kg (196 lb)   11/27/23 88.9 kg (196 lb)     Body mass index is 26.31 kg/m².   General: Well-developed well-nourished in no acute distress  HEENT: Normocephalic atraumatic  Neck: Supple, JVP is normal negative hepatojugular reflux 2+ carotid pulses without bruit  Pulmonary: Normal respiratory effort, clear to auscultation  Cardiovascular: No right ventricular heave, normal S1 and S2, no murmurs rubs or gallops  Abdomen: Soft nontender nondistended  Extremities: Warm without edema 2+ radial pulses bilaterally 2+ posterior tibial pulses bilaterally  Neurologic: Alert and oriented x3  Psychiatric: Normal mood and affect     Last Labs:  CMP:  Recent Labs     11/20/23  0757 11/28/22  0841 07/12/22  0741    142 141   K 4.2 4.3 4.0    107 107   CO2 30 29 25   ANIONGAP 11 10 13   BUN 11 13 10   CREATININE 0.84 0.85 0.82   EGFR >90  --   --    GLUCOSE 83 86 93     Recent Labs     11/20/23  0757 11/28/22  0841 07/12/22  0741   ALBUMIN 4.2 4.1 4.3   ALKPHOS 46 55 58   ALT 16 14 14   AST 18 19 20   BILITOT 0.7 0.7 0.5     CBC:  Recent Labs     11/20/23  0757 07/12/22  0741 11/10/21  0742   WBC 5.0 6.9 5.1   HGB 13.9 15.1 14.2   HCT 42.9 45.2 44.1   * 136* 124*   MCV 93 90 91     COAG: No results for input(s): \"INR\", \"DDIMERVTE\" in the last 85273 hours.  HEME/ENDO:  Recent Labs     11/20/23  0757 01/04/23  1011   TSH 3.17  --    HGBA1C  --  5.5      CARDIAC: No results for input(s): \"LDH\", \"CKMB\", \"TROPHS\", \"BNP\" in the last 27401 hours.    No lab exists for component: \"CK\", \"CKMBP\"  Recent Labs     07/16/24  0917 11/20/23  0757 07/12/22  0741 11/10/21  0742 07/02/21  1012   CHOL 138 137 130 130 140   LDLF  --   --  58 58 71   LDLCALC 57 59  --   --   --    HDL 68.9 " 65.4 58.6 58.3 58.6   TRIG 61 62 68 67 52       Last Cardiology Tests:  ECG:  Electrocardiogram performed today that I reviewed shows sinus bradycardia and is otherwise normal.    Echo:  Echocardiogram July 19, 2023  CONCLUSIONS:  1. Left ventricular systolic function is normal with a 55% estimated ejection fraction.  2. Stable mild dilation of aortic root at 3.8 cm.       Cath:      Stress Test:  Exercise stress echo May 11, 2016  The patient exercised to stage IV on a Eagle protocol for 9 minutes and 55 seconds, achieving 10.8 METS.   Summary:   1. Normal exercise stess echocardiogram at an above average workload.   2. Normal global left ventricular systolic function.   3. The resting ejection fraction was estimated at 60 to 65% with a peak exercise ejection fraction estimated at >75%.   4. The adequate level of stress was achieved.         Cardiac Imaging:  CT coronary angiogram with heart flow October 2017  AORTIC VALVE:  The aortic valve is trileaflet in morphology. No calcifications. The  aortic root is mildly dilated measuring 4.1 x 3.8 x 3.6 cm at the  level of the aortic sinuses. The sino-tubular junction measures 3.3 x  3.3 cm. The ascending thoracic aorta at the proximal level measures  3.5 by 3.3 cm.  IMPRESSION:  1. Right dominant coronary circulation.  2. Luminal irregularities in proximal LAD, circumflex, and right  coronary artery. No obstructive disease noted.  3. Aortic root dilatation measuring 4.1 x 3.8 x 3.6 cm at the level  of the aortic sinuses.    CT cardiac scoring April 26, 2016  IMPRESSION:     Coronary artery calcium score utilizing the method of Agatston is 470.        Assessment/Plan   Diagnoses and all orders for this visit:  Atherosclerosis of native coronary artery of native heart without angina pectoris  -     ECG 12 Lead  -     Transthoracic echo (TTE) complete; Future  -     perflutren lipid microspheres (Definity) injection 0.5-10 mL of dilution  -     perflutren protein A  microsphere (Optison) injection 0.5 mL  Aortic root dilatation (CMS-HCC)  -     Transthoracic echo (TTE) complete; Future  -     perflutren lipid microspheres (Definity) injection 0.5-10 mL of dilution  -     perflutren protein A microsphere (Optison) injection 0.5 mL  Pure hypercholesterolemia  -     ezetimibe (Zetia) 10 mg tablet; Take 1 tablet (10 mg) by mouth once daily.  -     Comprehensive metabolic panel; Future  -     Lipid Panel; Future  Former smoker  -     Vascular US abdominal aorta anuerysm AAA screening; Future    In summary Mr. Waggoner is a pleasant 68 year-old white male with a past medical history significant for coronary atherosclerosis with a CT calcium score of 470 in 2016 and nonobstructive disease on CT angiography with preserved LV function, hyperlipidemia, mild dilation of his aortic root on CT, and sarcoidosis.  He is asymptomatic from a cardiac perspective.  His blood pressure is at goal.  His most recent lipid profile was not at an LDL goal of less than 55 given his high calcium score.  I did add ezetimibe 10 mg daily.  Will repeat a lipid profile and CMP in 3 months.  He should continue his other cardiovascular medications.  I did order an abdominal aortic ultrasound to screen for an aneurysm given his prior tobacco use.  His aortic root is stable on today's echo.  I will see him back in follow-up in 1 year with an echo for surveillance of his aortic root.      Orders:  Orders Placed This Encounter   Procedures    Comprehensive metabolic panel    Lipid Panel    ECG 12 Lead    Transthoracic echo (TTE) complete      Followup Appts:  Future Appointments   Date Time Provider Department [place]   7/17/2024 10:00 AM Lai Pérez MD [id number]   8/7/2024 11:00 AM Bharati Brannon MD [facility]           ____________________________________________________________  Lai Pérez MD  [facility]  [facility]

## 2024-07-17 NOTE — PATIENT INSTRUCTIONS
Check abdominal aortic ultrasound.  Start ezetimibe 10 mg daily.  Recheck a fasting lipid profile and CMP in 3 months.  Follow-up in 1 year with an echo.

## 2024-08-02 LAB
ATRIAL RATE: 49 BPM
P AXIS: 64 DEGREES
P OFFSET: 186 MS
P ONSET: 131 MS
PR INTERVAL: 180 MS
Q ONSET: 221 MS
QRS COUNT: 8 BEATS
QRS DURATION: 102 MS
QT INTERVAL: 414 MS
QTC CALCULATION(BAZETT): 373 MS
QTC FREDERICIA: 387 MS
R AXIS: 31 DEGREES
T AXIS: 52 DEGREES
T OFFSET: 428 MS
VENTRICULAR RATE: 49 BPM

## 2024-08-07 ENCOUNTER — OFFICE VISIT (OUTPATIENT)
Dept: PAIN MEDICINE | Facility: CLINIC | Age: 69
End: 2024-08-07
Payer: MEDICARE

## 2024-08-07 VITALS
OXYGEN SATURATION: 97 % | TEMPERATURE: 96.4 F | SYSTOLIC BLOOD PRESSURE: 130 MMHG | RESPIRATION RATE: 18 BRPM | HEART RATE: 48 BPM | WEIGHT: 194 LBS | HEIGHT: 72 IN | DIASTOLIC BLOOD PRESSURE: 72 MMHG | BODY MASS INDEX: 26.28 KG/M2

## 2024-08-07 DIAGNOSIS — Z79.891 LONG TERM (CURRENT) USE OF OPIATE ANALGESIC: Primary | ICD-10-CM

## 2024-08-07 PROCEDURE — 80366 DRUG SCREENING PREGABALIN: CPT | Performed by: ANESTHESIOLOGY

## 2024-08-07 PROCEDURE — 1159F MED LIST DOCD IN RCRD: CPT | Performed by: ANESTHESIOLOGY

## 2024-08-07 PROCEDURE — 99214 OFFICE O/P EST MOD 30 MIN: CPT | Performed by: ANESTHESIOLOGY

## 2024-08-07 PROCEDURE — 3008F BODY MASS INDEX DOCD: CPT | Performed by: ANESTHESIOLOGY

## 2024-08-07 PROCEDURE — 1126F AMNT PAIN NOTED NONE PRSNT: CPT | Performed by: ANESTHESIOLOGY

## 2024-08-07 ASSESSMENT — ENCOUNTER SYMPTOMS
LOSS OF SENSATION IN FEET: 0
DEPRESSION: 0
OCCASIONAL FEELINGS OF UNSTEADINESS: 0

## 2024-08-07 ASSESSMENT — PAIN SCALES - GENERAL: PAINLEVEL: 0-NO PAIN

## 2024-08-07 NOTE — PROGRESS NOTES
This is 69 y.o.  male with who has been treated for  right shoulder  . Pain is  managed  , The pain is described as  burning  and is relieved by Medications    with who is here for follow-up   Chief Complaint   Patient presents with    Follow-up       Pain Therapies:  pregablin

## 2024-08-07 NOTE — PROGRESS NOTES
SUBJECTIVE:  This is 69 y.o.  male with PMH of Postherpetic neuralgia of the right chest and armpit that treated with pregabalin 150 mg twice a day and has been stable on it for many years.  The patient developed rash the last time and I gave him acyclovir to have at home in case that anytime rash happened to take it.  So far there is no more rash.  He is doing very good.  No side effects from the medication and he is staying very active and very involved with his health and his physical fitness.  The patient tweaked his back when he was playing golf few weeks ago but it went away by itself and he never had that radicular pain that we treated with TFESI years ago.      Prior office visit:  5/1/2024: This is 68 y.o.  male with PMH of Postherpetic neuralgia of the right chest and armpit postherpetic neuralgia usually stable on pregabalin.  The patient recently had a new rash over his shoulder of shingles and he took old acyclovir that I have for him and immediately disappeared.  I gave him the refill on acyclovir to have it at home to use it immediately in the case of rash.  The patient is stable on his pregabalin 150 mg twice daily with excellent control of his symptoms who is here for follow-up who stated that unfortunately when he went to  his prescription before his trip to [place] the pharmacy wanted approval from physician and they did not have it they are available so I had to send it to a different pharmacy than that he eventually got his prescription and he is okay regarding that.  I explained to him to try always get his prescription around 10 days before the expiration date since the pharmacy may not carry the medication on time for him.  The patient still doing very well with his medication without any side effect and he does not have any new rash.             Last procedure:   L5-S1 lumbar transforaminal EMILY for radiculopathy long time ago at [facility]     Portions of record reviewed for  pertinent issues: active problem list, medication list, allergies, family history, social history, notes from last encounter, encounters, lab results, imaging and other system records.           I have personally reviewed the [place]AR[place] report for this patient. This report is scanned into the electronic medical record. I have considered the risks of abuse, dependence, addiction and diversion. It showed pregabalin 150 mg twice daily  OPIOID RISK ASSESSMENT SCORE: 0/26  Opioid agreement: 5/1/2024  Activities of daily living: Very active and no side effects from the medication  Adverse effects: None  Analgesia: W/O 8/10, W 0/10  Toxicology screen: 5/1/2024  Aberrant behavior: None  Patient is being treated with pregabalin therapy for pain and is responding appropriately.  There are NO signs of opioid intoxication, abuse, addiction or withdrawal. Pupils are equal, reactive to light bilateral, appropriate speech and cognition. Patient denies any opioid induced constipation. The OARRS registry followed periodically, urine toxicology completed and appropriate.      Patient is advised and warned in specific detail about potential benefits of opioids along with risks and side effects including, but not limited to, dependency, addiction, tolerance, hyperalgesia, anxiety, depression, insomnia, endocrine changes, immunologic disturbances, respiratory depression and death.      Caution advised regarding the use of medications prescribed at this office and specific mention made regarding not to drive or operate heavy machinery if feeling side effects from this the medications. Patient expressed an understanding in regards to these particular concerns.      Discussed non-opioid options including (But no limited), physical wellness, antiinflammatory diet, icing and heating, meditation, relaxation, massage and acupuncture.     We will continue to monitor and adjust medications as needed.                 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     Eyes: Negative.    Respiratory: Negative.     Cardiovascular: Negative.    Gastrointestinal: Negative.    Endocrine: Negative.    Genitourinary: Negative.    Musculoskeletal:  Positive for arthralgias and myalgias.   Skin: Negative.    Neurological: Negative.    Hematological: Negative.    Psychiatric/Behavioral: Negative.           Physical Exam  Vitals and nursing note reviewed.   Constitutional:       Appearance: Normal appearance.   HENT:      Head: Normocephalic and atraumatic.      Nose: Nose normal.   Eyes:      Extraocular Movements: Extraocular movements intact.      Conjunctiva/sclera: Conjunctivae normal.      Pupils: Pupils are equal, round, and reactive to light.   Cardiovascular:      Rate and Rhythm: Normal rate and regular rhythm.      Pulses: Normal pulses.      Heart sounds: Normal heart sounds.   Pulmonary:      Effort: Pulmonary effort is normal.      Breath sounds: Normal breath sounds.   Abdominal:      General: Abdomen is flat. Bowel sounds are normal.      Palpations: Abdomen is soft.   Musculoskeletal:         General: Tenderness present.   Skin:     General: Skin is warm.   Neurological:      General: No focal deficit present.      Mental Status: He is alert and oriented to person, place, and time.   Psychiatric:         Mood and Affect: Mood normal.         Behavior: Behavior normal.                Plan  At least 50% of the visit was involved in the discussion of the options for treatment. We discussed exercises, medication, interventional therapies and surgery. Healthy life style is essential with patient hard work to achieve the wellness. In addition; discussion with the patient and/or family about any of the diagnostic results, impressions and/or  recommended diagnostic studies, prognosis, risks and benefits of treatment options, instructions for treatment and/or follow-up, importance of compliance with chosen treatment options, risk-factor reduction, and patient/family education.         Pool therapy, walking in the pool, at least 3x per week for 30 minutes  Continue self-directed physical therapy  Continue pregabalin 150 mg twice daily  Healthy lifestyle and anti-inflammatory diet in addition to weight control discussed with the patient  Alternative chronic pain therapies was discussed, encouraged and information was handed  Return to Clinic 4 to 6 months     *Please note this report has been produced using speech recognition software and may contain errors related to that system including grammar, punctuation and spelling as well as words and phrases that may be inappropriate. If there are questions or concerns, please feel free to contact me to clarify.    Bharati Brannon MD

## 2024-08-10 LAB — PREGABALIN UR CFM-MCNC: 84.7 UG/ML

## 2024-09-20 DIAGNOSIS — E78.00 PURE HYPERCHOLESTEROLEMIA: Primary | ICD-10-CM

## 2024-09-20 DIAGNOSIS — E78.5 HYPERLIPIDEMIA, UNSPECIFIED HYPERLIPIDEMIA TYPE: ICD-10-CM

## 2024-09-20 RX ORDER — ROSUVASTATIN CALCIUM 40 MG/1
40 TABLET, COATED ORAL NIGHTLY
Qty: 90 TABLET | Refills: 3 | Status: SHIPPED | OUTPATIENT
Start: 2024-09-20 | End: 2025-09-20

## 2024-09-25 DIAGNOSIS — B02.29 POSTHERPETIC NEURALGIA: ICD-10-CM

## 2024-09-26 RX ORDER — PREGABALIN 150 MG/1
150 CAPSULE ORAL 2 TIMES DAILY
Qty: 180 CAPSULE | Refills: 0 | Status: SHIPPED | OUTPATIENT
Start: 2024-09-26 | End: 2024-12-25

## 2024-10-16 ENCOUNTER — APPOINTMENT (OUTPATIENT)
Dept: PRIMARY CARE | Facility: CLINIC | Age: 69
End: 2024-10-16
Payer: MEDICARE

## 2024-10-16 DIAGNOSIS — Z71.9 ENCOUNTER FOR CONSULTATION: Primary | ICD-10-CM

## 2024-10-16 DIAGNOSIS — Z00.00 WELLNESS EXAMINATION: ICD-10-CM

## 2024-10-16 PROCEDURE — UHSMG PR UH SELECT MEET AND GREET: Performed by: INTERNAL MEDICINE

## 2024-10-16 NOTE — PROGRESS NOTES
Patient presents for meet/greet  Former patient of Dr. Jain  Information regarding practice provided and physical to be scheduled in November/December    Fasting lab work ordered to complete prior to visit.    Vimal Silvestre MD

## 2024-10-24 ENCOUNTER — LAB (OUTPATIENT)
Dept: LAB | Facility: LAB | Age: 69
End: 2024-10-24
Payer: MEDICARE

## 2024-10-24 DIAGNOSIS — Z00.00 WELLNESS EXAMINATION: ICD-10-CM

## 2024-10-24 LAB
25(OH)D3 SERPL-MCNC: 24 NG/ML (ref 30–100)
ALBUMIN SERPL BCP-MCNC: 4.3 G/DL (ref 3.4–5)
ALP SERPL-CCNC: 44 U/L (ref 33–136)
ALT SERPL W P-5'-P-CCNC: 17 U/L (ref 10–52)
ANION GAP SERPL CALC-SCNC: 11 MMOL/L (ref 10–20)
APPEARANCE UR: CLEAR
AST SERPL W P-5'-P-CCNC: 20 U/L (ref 9–39)
BASOPHILS # BLD AUTO: 0.03 X10*3/UL (ref 0–0.1)
BASOPHILS NFR BLD AUTO: 0.6 %
BILIRUB SERPL-MCNC: 0.6 MG/DL (ref 0–1.2)
BILIRUB UR STRIP.AUTO-MCNC: NEGATIVE MG/DL
BUN SERPL-MCNC: 8 MG/DL (ref 6–23)
CALCIUM SERPL-MCNC: 9.3 MG/DL (ref 8.6–10.6)
CHLORIDE SERPL-SCNC: 107 MMOL/L (ref 98–107)
CHOLEST SERPL-MCNC: 116 MG/DL (ref 0–199)
CHOLESTEROL/HDL RATIO: 1.9
CO2 SERPL-SCNC: 30 MMOL/L (ref 21–32)
COLOR UR: NORMAL
CREAT SERPL-MCNC: 0.79 MG/DL (ref 0.5–1.3)
CRP SERPL HS-MCNC: 0.6 MG/L
EGFRCR SERPLBLD CKD-EPI 2021: >90 ML/MIN/1.73M*2
EOSINOPHIL # BLD AUTO: 0.16 X10*3/UL (ref 0–0.7)
EOSINOPHIL NFR BLD AUTO: 3.1 %
ERYTHROCYTE [DISTWIDTH] IN BLOOD BY AUTOMATED COUNT: 13.2 % (ref 11.5–14.5)
EST. AVERAGE GLUCOSE BLD GHB EST-MCNC: 114 MG/DL
GLUCOSE SERPL-MCNC: 92 MG/DL (ref 74–99)
GLUCOSE UR STRIP.AUTO-MCNC: NORMAL MG/DL
HBA1C MFR BLD: 5.6 %
HCT VFR BLD AUTO: 44.2 % (ref 41–52)
HDLC SERPL-MCNC: 60.6 MG/DL
HGB BLD-MCNC: 14.4 G/DL (ref 13.5–17.5)
IMM GRANULOCYTES # BLD AUTO: 0 X10*3/UL (ref 0–0.7)
IMM GRANULOCYTES NFR BLD AUTO: 0 % (ref 0–0.9)
KETONES UR STRIP.AUTO-MCNC: NEGATIVE MG/DL
LDLC SERPL CALC-MCNC: 42 MG/DL
LEUKOCYTE ESTERASE UR QL STRIP.AUTO: NEGATIVE
LYMPHOCYTES # BLD AUTO: 1.72 X10*3/UL (ref 1.2–4.8)
LYMPHOCYTES NFR BLD AUTO: 33.3 %
MCH RBC QN AUTO: 30.3 PG (ref 26–34)
MCHC RBC AUTO-ENTMCNC: 32.6 G/DL (ref 32–36)
MCV RBC AUTO: 93 FL (ref 80–100)
MONOCYTES # BLD AUTO: 0.59 X10*3/UL (ref 0.1–1)
MONOCYTES NFR BLD AUTO: 11.4 %
NEUTROPHILS # BLD AUTO: 2.66 X10*3/UL (ref 1.2–7.7)
NEUTROPHILS NFR BLD AUTO: 51.6 %
NITRITE UR QL STRIP.AUTO: NEGATIVE
NON HDL CHOLESTEROL: 55 MG/DL (ref 0–149)
NRBC BLD-RTO: 0 /100 WBCS (ref 0–0)
PH UR STRIP.AUTO: 5.5 [PH]
PLATELET # BLD AUTO: 124 X10*3/UL (ref 150–450)
POTASSIUM SERPL-SCNC: 4 MMOL/L (ref 3.5–5.3)
PROT SERPL-MCNC: 6.7 G/DL (ref 6.4–8.2)
PROT UR STRIP.AUTO-MCNC: NEGATIVE MG/DL
PSA SERPL-MCNC: 0.98 NG/ML
RBC # BLD AUTO: 4.75 X10*6/UL (ref 4.5–5.9)
RBC # UR STRIP.AUTO: NEGATIVE /UL
SODIUM SERPL-SCNC: 144 MMOL/L (ref 136–145)
SP GR UR STRIP.AUTO: 1.01
TRIGL SERPL-MCNC: 68 MG/DL (ref 0–149)
TSH SERPL-ACNC: 3.06 MIU/L (ref 0.44–3.98)
UROBILINOGEN UR STRIP.AUTO-MCNC: NORMAL MG/DL
VLDL: 14 MG/DL (ref 0–40)
WBC # BLD AUTO: 5.2 X10*3/UL (ref 4.4–11.3)

## 2024-10-24 PROCEDURE — 84153 ASSAY OF PSA TOTAL: CPT

## 2024-10-24 PROCEDURE — 81003 URINALYSIS AUTO W/O SCOPE: CPT

## 2024-10-24 PROCEDURE — 85025 COMPLETE CBC W/AUTO DIFF WBC: CPT

## 2024-10-24 PROCEDURE — 36415 COLL VENOUS BLD VENIPUNCTURE: CPT

## 2024-10-24 PROCEDURE — 86141 C-REACTIVE PROTEIN HS: CPT

## 2024-10-24 PROCEDURE — 83036 HEMOGLOBIN GLYCOSYLATED A1C: CPT

## 2024-10-24 PROCEDURE — 80061 LIPID PANEL: CPT

## 2024-10-24 PROCEDURE — 80053 COMPREHEN METABOLIC PANEL: CPT

## 2024-10-24 PROCEDURE — 84443 ASSAY THYROID STIM HORMONE: CPT

## 2024-10-24 PROCEDURE — 82306 VITAMIN D 25 HYDROXY: CPT

## 2024-10-30 ASSESSMENT — PROMIS GLOBAL HEALTH SCALE
RATE_QUALITY_OF_LIFE: EXCELLENT
RATE_GENERAL_HEALTH: VERY GOOD
CARRYOUT_SOCIAL_ACTIVITIES: EXCELLENT
CARRYOUT_PHYSICAL_ACTIVITIES: COMPLETELY
RATE_PHYSICAL_HEALTH: VERY GOOD
RATE_SOCIAL_SATISFACTION: EXCELLENT
RATE_AVERAGE_PAIN: 3
RATE_MENTAL_HEALTH: EXCELLENT
EMOTIONAL_PROBLEMS: NEVER

## 2024-10-31 ENCOUNTER — APPOINTMENT (OUTPATIENT)
Dept: PRIMARY CARE | Facility: CLINIC | Age: 69
End: 2024-10-31
Payer: MEDICARE

## 2024-10-31 VITALS
BODY MASS INDEX: 27.22 KG/M2 | HEIGHT: 72 IN | SYSTOLIC BLOOD PRESSURE: 124 MMHG | WEIGHT: 201 LBS | OXYGEN SATURATION: 98 % | DIASTOLIC BLOOD PRESSURE: 64 MMHG | HEART RATE: 51 BPM

## 2024-10-31 DIAGNOSIS — E55.9 VITAMIN D DEFICIENCY: ICD-10-CM

## 2024-10-31 DIAGNOSIS — Z00.00 WELLNESS EXAMINATION: ICD-10-CM

## 2024-10-31 DIAGNOSIS — E78.2 MIXED HYPERLIPIDEMIA: ICD-10-CM

## 2024-10-31 DIAGNOSIS — Z23 INFLUENZA VACCINE ADMINISTERED: ICD-10-CM

## 2024-10-31 DIAGNOSIS — D69.6 THROMBOCYTOPENIA (CMS-HCC): ICD-10-CM

## 2024-10-31 DIAGNOSIS — B02.29 POSTHERPETIC NEURALGIA: ICD-10-CM

## 2024-10-31 DIAGNOSIS — H90.3 SENSORINEURAL HEARING LOSS (SNHL) OF BOTH EARS: ICD-10-CM

## 2024-10-31 DIAGNOSIS — R00.2 PALPITATIONS: ICD-10-CM

## 2024-10-31 DIAGNOSIS — N52.9 ERECTILE DYSFUNCTION, UNSPECIFIED ERECTILE DYSFUNCTION TYPE: ICD-10-CM

## 2024-10-31 DIAGNOSIS — Z00.00 MEDICARE ANNUAL WELLNESS VISIT, SUBSEQUENT: Primary | ICD-10-CM

## 2024-10-31 DIAGNOSIS — Z85.828 HISTORY OF SKIN CANCER: ICD-10-CM

## 2024-10-31 DIAGNOSIS — I77.810 AORTIC ROOT DILATATION (CMS-HCC): ICD-10-CM

## 2024-10-31 PROBLEM — R07.9 CHEST PAIN: Status: RESOLVED | Noted: 2023-08-26 | Resolved: 2024-10-31

## 2024-10-31 PROBLEM — M72.2 PLANTAR FASCIITIS OF LEFT FOOT: Status: RESOLVED | Noted: 2023-08-26 | Resolved: 2024-10-31

## 2024-10-31 PROCEDURE — 93000 ELECTROCARDIOGRAM COMPLETE: CPT | Performed by: INTERNAL MEDICINE

## 2024-10-31 RX ORDER — SILDENAFIL 100 MG/1
TABLET, FILM COATED ORAL
Qty: 6 TABLET | Refills: 3 | Status: SHIPPED | OUTPATIENT
Start: 2024-10-31

## 2024-10-31 RX ORDER — ACETAMINOPHEN 500 MG
2000 TABLET ORAL DAILY
Start: 2024-10-31

## 2024-10-31 NOTE — PROGRESS NOTES
Jadiel Waggoner is a 69 y.o. year old here for a Medicare Annual Wellness Visit     Health Risk Assessment  In general, health is:  Very good     Concerns with balance:  No     Concerns with teeth or dentures:  No     Concerns with sexual function:  No     Felt anxious, stressed, angry, irritable, lonely, isolated, or had thoughts of hurting themself:  No     Has little interest or pleasure in doing things:  No     Bothered by feeling down, depressed, or hopeless:  No     Needs help with grocery shopping, cooking, housework, bathing, grooming, dressing, eating, sitting or standing, walking, using the toilet, handling finances, taking medications, using the telephone, or driving:  No     Following safety precautions in the home environment and vehicle: removed throw rugs from floors, installed grab bars in the bathroom, handrails in stairwells, having adequate lighting, wearing seatbelt at all times?:  Yes     Smokes cigarettes, vapes, or chew tobacco:  No     Eats healthy foods including fruits, vegetables, whole grains, and fiber-rich foods:  Daily, well-balanced     Number of days per week engages in exercise:  $-6 days a week     Average alcohol consumption: 7-14 drinks        Current Providers  Specialists: I have reviewed specialist-related care of the patient in the medical record.     Medical/Family history review  Reviewed and updated problem list, medical/surgical/family/social history, medications, and allergies.     Opioid use review  Patient use of opioids:  No           Depression screening  Depression Screening PHQ-2 Score   2/14/2023 0         Depression screening tool completed and reviewed. Based on score and interview, patient is not at risk for depression. Screening tool discussed with patient, and I recommended no further intervention at this time.     Cognitive screening  Mini Cog Score:  5/5     Cognitive screening reviewed and plan:  yes     Functional Observation  Was the patient's timed Up & Go  "test unsteady or >= 12 seconds?  No     Advance Care Planning  End of Life planning discussed, including patient's advanced directive wishes:  Yes    Vision and Hearing assessment  Visual acuity (required for Welcome to Medicare):  Ophthalmology  Hearing Evaluation:  Hearing loss with hearing aides    ====================================================    /64 (BP Location: Left arm, Patient Position: Sitting, BP Cuff Size: Adult)   Pulse 51   Ht 1.816 m (5' 11.5\")   Wt 91.2 kg (201 lb)   SpO2 98%   BMI 27.64 kg/m²     Chief Complaint   Patient presents with    Annual Exam     Mini-cog score 5/5       Wellness exam/physical ( [facility])  (Former patient of Dr. Jain who has retired from practice)    Coronary atherosclerosis  Aortic root dilation   Active lifestyle without angina, shortness of breath, lightheadedness, near syncope.   cardiology, Dr. Pérez    Hyperlipidemia  Most recently, Zetia added to rosuvastatin.  Lipid panel reviewed and values show improvement.    Irregular rhythm during cardiac exam  Rapid, irregular, brief potential arrhythmia (perhaps 5 seconds in duration) noted on cardiac exam.  Patient admits to episodic palpitations which have been infrequent but present for some time.  No associated symptoms.  No previous diagnosis of arrhythmias, no recent testing such as event monitor.  ECG without acute changes today.    Vitamin D deficiency  Vitamin D level is low, not on any supplementation.    Thrombocytopenia  Longstanding, mild decrease in platelet count.  Stable on current lab work.    Erectile dysfunction  Viagra has been effective without side effects.  Refill requested.    Postherpetic neuralgia  2 prior episodes of herpes zoster, right facial in 2003 and left shoulder in 2022.  Patient has been on Lyrica, managed by pain management at [facility], Dr. Brannon    Bilateral hearing loss  Hearing aids are in use and effective.  Management per [facility], Jac Briseno    Pulmonary " sarcoidosis  Diagnosis in 1997 when chest x-ray done for respiratory symptoms/dyspnea notable for lymphadenopathy.  Subsequent pulmonary consult with Dr. Greene and subsequently on prednisone (averaging 10-15 mg/day) over the next 3 to 4 years, discontinued in 2001.  No recent follow-up with pulmonary medicine.  No current coughing, increased dyspnea, wheezing.    History of recurrent skin cancer (squamous cell carcinoma, basal cell carcinoma)  Close follow-up with Dr. Courtney Day in dermatology every 3 to 6 months.    Health maintenance  Exercise: Trainer 4 days a week, walking 2-3 times per week  Colon cancer screening: Normal colonoscopy on 10/6/2020, 10-year follow-up recommended.  Ophthalmology: Dr. Nelson, next appointment in November  Dermatology: See HPI  Dentistry: Regular periodontist appointments.    Social  Born in [place].   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          Review of Systems   Constitutional: Negative for malaise/fatigue.   HENT:  Positive for hearing loss. Negative for sore throat.    Eyes:  Negative for blurred vision and visual disturbance.   Cardiovascular:  Positive for palpitations. Negative for chest pain, dyspnea on exertion and leg swelling.   Respiratory:  Negative for cough and shortness of breath.    Skin:  Negative for rash.   Musculoskeletal:  Negative for back pain, joint pain and muscle weakness.   Gastrointestinal:  Negative for abdominal pain, constipation, diarrhea and heartburn.   Genitourinary:  Negative for dysuria, hematuria and hesitancy.   Neurological:  Negative for dizziness, headaches and weakness.   Psychiatric/Behavioral:  Negative for depression. The patient does not have insomnia and is not nervous/anxious.         Physical Exam  Vitals reviewed.   HENT:      Head: Normocephalic.      Right  Ear: Tympanic membrane normal.      Left Ear: Tympanic membrane normal.      Mouth/Throat:      Mouth: Mucous membranes are moist.      Pharynx: No oropharyngeal exudate or posterior oropharyngeal erythema.   Eyes:      Conjunctiva/sclera: Conjunctivae normal.   Neck:      Vascular: No carotid bruit.   Cardiovascular:      Rate and Rhythm: Normal rate and regular rhythm.      Heart sounds: No murmur heard.     Comments: Brief approximately 5-second episode of rapid, irregular heartbeats  Pulmonary:      Effort: Pulmonary effort is normal.      Breath sounds: Normal breath sounds. No rales.   Abdominal:      General: Bowel sounds are normal.      Palpations: Abdomen is soft.      Tenderness: There is no abdominal tenderness.   Genitourinary:     Prostate: Normal.   Musculoskeletal:         General: Normal range of motion.      Right lower leg: No edema.      Left lower leg: No edema.   Lymphadenopathy:      Cervical: No cervical adenopathy.   Neurological:      General: No focal deficit present.      Mental Status: He is alert and oriented to person, place, and time.   Psychiatric:         Mood and Affect: Mood normal.           Assessment and Plan    Medicare annual wellness visit (subsequent)  Continued regular exercise recommended  Continued well-balanced diet rich in fruits, vegetables, fiber, lean protein recommended  Continued routine follow-up with ophthalmology and dentistry/periodontist recommended  High-dose influenza vaccine given today.  Other vaccines recommended to receive at pharmacy include:  Shingrix, Tdap, COVID-19  Next screening colonoscopy will be due in 10/2030  Providing copy of advance directives (DURABLE POWER OF  for healthcare) to place in chart recommended    ================================    Wellness exam/physical ( [facility])  Continued regular exercise recommended  Continued well-balanced diet rich in fruits, vegetables, fiber, lean protein recommended  Continued routine  follow-up with ophthalmology and dentistry/periodontist recommended  High-dose influenza vaccine given today.  Other vaccines recommended to receive at pharmacy include:  Shingrix, Tdap, COVID-19  Next screening colonoscopy will be due in 10/2030  Providing copy of advance directives (DURABLE POWER OF  for healthcare) to place in chart recommended    Coronary atherosclerosis  Aortic root dilation (stable on most recent echocardiogram)  Hyperlipidemia (improved with the addition of Zetia to rosuvastatin)  Continued healthy diet and regular exercise recommended  Reschedule vascular ultrasound of abdominal aorta  Continue Zetia, aspirin, rosuvastatin at current dosing    Palpitations, evaluate for cardiac arrhythmia  Cardiac event monitor ordered    Vitamin D deficiency  Start over-the-counter vitamin D 2000 IU daily (take with food for better absorption)  Vitamin D level in April 2025    Thrombocytopenia (stable)  Monitor CBC expectantly  Surveillance CBC ordered to complete in April 2025    Erectile dysfunction  Continue Viagra 100 mg, 1/2-1 tablet as needed    Postherpetic neuralgia  Continue Lyrica at current dosing  Continue routine follow-up with [facility], Dr. Brannon    History of recurrent skin cancer  Continue routine follow-up with dermatology, Dr. Day.    Bilateral hearing loss  Continue hearing aid use    Follow-up  Wellness exam/physical in 1 year, on/after 11/1/2025  (Fasting lab work will be ordered to complete 3 to 5 days prior)    Vimal Silvestre MD

## 2024-10-31 NOTE — PATIENT INSTRUCTIONS
Wellness exam/physical ([facility])  Continued regular exercise recommended  Continued well-balanced diet rich in fruits, vegetables, fiber, lean protein recommended  Continued routine follow-up with ophthalmology and dentistry/periodontist recommended  High-dose influenza vaccine given today.  Other vaccines recommended to receive at pharmacy include:  Shingrix, Tdap, COVID-19  Next screening colonoscopy will be due in 10/2030  Providing copy of advance directives (DURABLE POWER OF  for healthcare) to place in chart recommended    Coronary atherosclerosis  Aortic root dilation (stable on most recent echocardiogram)  Hyperlipidemia (improved with the addition of Zetia to rosuvastatin)  Continued healthy diet and regular exercise recommended  Reschedule vascular ultrasound of abdominal aorta  Continue Zetia, aspirin, rosuvastatin at current dosing    Palpitations, evaluate for cardiac arrhythmia  Cardiac event monitor ordered    Vitamin D deficiency  Start over-the-counter vitamin D 2000 IU daily (take with food for better absorption)  Vitamin D level in April 2025    Thrombocytopenia (stable)  Monitor CBC expectantly  Surveillance CBC ordered to complete in April 2025    Erectile dysfunction  Continue Viagra 100 mg, 1/2-1 tablet as needed    Postherpetic neuralgia  Continue Lyrica at current dosing  Continue routine follow-up with [facility] pain [facility], Dr. Brannon    Bilateral hearing loss  Continue hearing aid use    Follow-up  Wellness exam/physical in 1 year, on/after 11/1/2025  (Fasting lab work will be ordered to complete 3 to 5 days prior)

## 2024-10-31 NOTE — Clinical Note
Jadiel Smith was seen for the first time as new patient.  Overall, he is feeling quite well. 2 items to bring to your attention: 1.  Lipid panel done, first results with the addition of Zetia.  I let him know I would share with you. 2.  During exam, marcated brief approximately 5-second episode of erratic rapid heartbeat.  Upon further questioning, he does admit to intermittent palpitations. Event monitor ordered. If any additional thoughts, let me know. ThanksPo

## 2024-11-04 ASSESSMENT — ENCOUNTER SYMPTOMS
HEADACHES: 0
DIARRHEA: 0
DIZZINESS: 0
COUGH: 0
DYSURIA: 0
PALPITATIONS: 1
BLURRED VISION: 0
SORE THROAT: 0
DEPRESSION: 0
WEAKNESS: 0
BACK PAIN: 0
INSOMNIA: 0
CONSTIPATION: 0
ABDOMINAL PAIN: 0
HEARTBURN: 0
NERVOUS/ANXIOUS: 0
DYSPNEA ON EXERTION: 0
SHORTNESS OF BREATH: 0
HEMATURIA: 0

## 2024-11-18 ENCOUNTER — HOSPITAL ENCOUNTER (OUTPATIENT)
Dept: CARDIOLOGY | Facility: HOSPITAL | Age: 69
Discharge: HOME | End: 2024-11-18
Payer: COMMERCIAL

## 2024-11-18 DIAGNOSIS — R00.2 PALPITATIONS: ICD-10-CM

## 2024-11-18 PROCEDURE — 93246 EXT ECG>7D<15D RECORDING: CPT

## 2024-12-02 ENCOUNTER — HOSPITAL ENCOUNTER (OUTPATIENT)
Dept: VASCULAR MEDICINE | Facility: HOSPITAL | Age: 69
Discharge: HOME | End: 2024-12-02
Payer: COMMERCIAL

## 2024-12-02 DIAGNOSIS — Z13.6 ENCOUNTER FOR SCREENING FOR CARDIOVASCULAR DISORDERS: ICD-10-CM

## 2024-12-02 DIAGNOSIS — Z87.891 FORMER SMOKER: ICD-10-CM

## 2024-12-02 PROCEDURE — 76706 US ABDL AORTA SCREEN AAA: CPT

## 2024-12-02 PROCEDURE — 76706 US ABDL AORTA SCREEN AAA: CPT | Performed by: STUDENT IN AN ORGANIZED HEALTH CARE EDUCATION/TRAINING PROGRAM

## 2024-12-04 ENCOUNTER — APPOINTMENT (OUTPATIENT)
Dept: PAIN MEDICINE | Facility: CLINIC | Age: 69
End: 2024-12-04
Payer: COMMERCIAL

## 2024-12-04 VITALS — RESPIRATION RATE: 18 BRPM | HEIGHT: 71 IN | BODY MASS INDEX: 28.14 KG/M2 | WEIGHT: 201 LBS

## 2024-12-04 DIAGNOSIS — B02.29 POSTHERPETIC NEURALGIA: Primary | ICD-10-CM

## 2024-12-04 PROCEDURE — 1125F AMNT PAIN NOTED PAIN PRSNT: CPT | Performed by: ANESTHESIOLOGY

## 2024-12-04 PROCEDURE — 1036F TOBACCO NON-USER: CPT | Performed by: ANESTHESIOLOGY

## 2024-12-04 PROCEDURE — 3008F BODY MASS INDEX DOCD: CPT | Performed by: ANESTHESIOLOGY

## 2024-12-04 PROCEDURE — 99214 OFFICE O/P EST MOD 30 MIN: CPT | Performed by: ANESTHESIOLOGY

## 2024-12-04 PROCEDURE — 1159F MED LIST DOCD IN RCRD: CPT | Performed by: ANESTHESIOLOGY

## 2024-12-04 SDOH — ECONOMIC STABILITY: FOOD INSECURITY: WITHIN THE PAST 12 MONTHS, YOU WORRIED THAT YOUR FOOD WOULD RUN OUT BEFORE YOU GOT MONEY TO BUY MORE.: NEVER TRUE

## 2024-12-04 SDOH — ECONOMIC STABILITY: FOOD INSECURITY: WITHIN THE PAST 12 MONTHS, THE FOOD YOU BOUGHT JUST DIDN'T LAST AND YOU DIDN'T HAVE MONEY TO GET MORE.: NEVER TRUE

## 2024-12-04 ASSESSMENT — COLUMBIA-SUICIDE SEVERITY RATING SCALE - C-SSRS
6. HAVE YOU EVER DONE ANYTHING, STARTED TO DO ANYTHING, OR PREPARED TO DO ANYTHING TO END YOUR LIFE?: NO
2. HAVE YOU ACTUALLY HAD ANY THOUGHTS OF KILLING YOURSELF?: NO
1. IN THE PAST MONTH, HAVE YOU WISHED YOU WERE DEAD OR WISHED YOU COULD GO TO SLEEP AND NOT WAKE UP?: NO

## 2024-12-04 ASSESSMENT — PATIENT HEALTH QUESTIONNAIRE - PHQ9
2. FEELING DOWN, DEPRESSED OR HOPELESS: NOT AT ALL
1. LITTLE INTEREST OR PLEASURE IN DOING THINGS: NOT AT ALL
SUM OF ALL RESPONSES TO PHQ9 QUESTIONS 1 AND 2: 0

## 2024-12-04 ASSESSMENT — ENCOUNTER SYMPTOMS
GASTROINTESTINAL NEGATIVE: 1
RESPIRATORY NEGATIVE: 1
CARDIOVASCULAR NEGATIVE: 1
MYALGIAS: 1
ENDOCRINE NEGATIVE: 1
OCCASIONAL FEELINGS OF UNSTEADINESS: 0
LOSS OF SENSATION IN FEET: 0
DEPRESSION: 0
PSYCHIATRIC NEGATIVE: 1
HEMATOLOGIC/LYMPHATIC NEGATIVE: 1
EYES NEGATIVE: 1
NUMBNESS: 1

## 2024-12-04 ASSESSMENT — LIFESTYLE VARIABLES: TOTAL SCORE: 3

## 2024-12-04 ASSESSMENT — PAIN - FUNCTIONAL ASSESSMENT: PAIN_FUNCTIONAL_ASSESSMENT: 0-10

## 2024-12-04 ASSESSMENT — PAIN DESCRIPTION - DESCRIPTORS: DESCRIPTORS: BURNING

## 2024-12-04 ASSESSMENT — PAIN SCALES - GENERAL
PAINLEVEL_OUTOF10: 2
PAINLEVEL_OUTOF10: 2

## 2024-12-04 NOTE — PROGRESS NOTES
SUBJECTIVE:  This is 69 y.o.  male with PMH of Postherpetic neuralgia of the right chest and armpit that treated with pregabalin 150 mg twice a day and has been stable on it for many years.  The patient developed rash the last time and I gave him acyclovir to have at home in case that anytime rash happened to take it.  So far there is no more rash.  He is doing very good.  No side effects from the medication and he is staying very active and very involved with his health and his physical fitness.  who is here for follow-up stating that his medications are working fine everything is good and healthy.  The patient has switched his CPAP to [facility] after his jail and he is happy with the switch.  He had cardiac monitor because of high calcium score and he has an ultrasound of his vessels and there were within normal limits      Prior office visit:  8/7/2024: This is 69 y.o.  male with PMH of Postherpetic neuralgia of the right chest and armpit that treated with pregabalin 150 mg twice a day and has been stable on it for many years.  The patient developed rash the last time and I gave him acyclovir to have at home in case that anytime rash happened to take it.  So far there is no more rash.  He is doing very good.  No side effects from the medication and he is staying very active and very involved with his health and his physical fitness.  The patient tweaked his back when he was playing golf few weeks ago but it went away by itself and he never had that radicular pain that we treated with TFESI years ago.        Last procedure:   L5-S1 lumbar transforaminal EMILY for radiculopathy long time ago at [facility]     Portions of record reviewed for pertinent issues: active problem list, medication list, allergies, family history, social history, notes from last encounter, encounters, lab results, imaging and other system records.           I have personally reviewed the OARRS report for this patient. This  report is scanned into the electronic medical record. I have considered the risks of abuse, dependence, addiction and diversion. It showed pregabalin 150 mg twice daily  OPIOID RISK ASSESSMENT SCORE: 0/26  Opioid agreement: 5/1/2024  Activities of daily living: Very active and no side effects from the medication  Adverse effects: None  Analgesia: W/O 8/10, W 0/10  Toxicology screen: 5/1/2024  Aberrant behavior: None  Patient is being treated with pregabalin therapy for pain and is responding appropriately.  There are NO signs of opioid intoxication, abuse, addiction or withdrawal. Pupils are equal, reactive to light bilateral, appropriate speech and cognition. Patient denies any opioid induced constipation. The OAS registry followed periodically, urine toxicology completed and appropriate.      Patient is advised and warned in specific detail about potential benefits of opioids along with risks and side effects including, but not limited to, dependency, addiction, tolerance, hyperalgesia, anxiety, depression, insomnia, endocrine changes, immunologic disturbances, respiratory depression and death.      Caution advised regarding the use of medications prescribed at this office and specific mention made regarding not to drive or operate heavy machinery if feeling side effects from this the medications. Patient expressed an understanding in regards to these particular concerns.      Discussed non-opioid options including (But no limited), physical wellness, antiinflammatory diet, icing and heating, meditation, relaxation, massage and acupuncture.     We will continue to monitor and adjust medications as needed.                 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     Eyes: Negative.    Respiratory:  Negative.     Cardiovascular: Negative.    Gastrointestinal: Negative.    Endocrine: Negative.    Genitourinary: Negative.    Musculoskeletal:  Positive for myalgias.   Skin: Negative.    Neurological:  Positive for numbness.   Hematological: Negative.    Psychiatric/Behavioral: Negative.          Physical Exam  Vitals and nursing note reviewed.   Constitutional:       Appearance: Normal appearance.       HENT:      Head: Normocephalic and atraumatic.      Nose: Nose normal.   Eyes:      Extraocular Movements: Extraocular movements intact.      Conjunctiva/sclera: Conjunctivae normal.      Pupils: Pupils are equal, round, and reactive to light.   Cardiovascular:      Rate and Rhythm: Normal rate and regular rhythm.      Pulses: Normal pulses.      Heart sounds: Normal heart sounds.   Pulmonary:      Effort: Pulmonary effort is normal.      Breath sounds: Normal breath sounds.   Abdominal:      General: Abdomen is flat. Bowel sounds are normal.      Palpations: Abdomen is soft.   Musculoskeletal:         General: Tenderness present.   Skin:     General: Skin is warm.   Neurological:      General: No focal deficit present.      Mental Status: He is alert and oriented to person, place, and time.   Psychiatric:         Mood and Affect: Mood normal.         Behavior: Behavior normal.                      Plan  At least 50% of the visit was involved in the discussion of the options for treatment. We discussed exercises, medication, interventional therapies and surgery. Healthy life style is essential with patient hard work to achieve the wellness. In addition; discussion with the patient and/or family about any of the diagnostic results, impressions and/or recommended diagnostic studies, prognosis, risks and benefits of treatment options, instructions for treatment and/or follow-up, importance of compliance with chosen treatment options, risk-factor reduction, and patient/family education.         Recommended Pool  therapy, walking in the pool, at least 3x per week for 30 minutes  Continue self-directed physical therapy with at least daily exercises for minimum of 20-minute, brochure was handed to the patient  Continue pregabalin 150 mg twice daily  Healthy lifestyle and anti-inflammatory diet in addition to weight control discussed with the patient  Alternative chronic pain therapies was discussed, encouraged and information was handed  Return to Clinic 3-6 months     *Please note this report has been produced using speech recognition software and may contain errors related to that system including grammar, punctuation and spelling as well as words and phrases that may be inappropriate. If there are questions or concerns, please feel free to contact me to clarify.    Bharati Branonn MD

## 2024-12-04 NOTE — PROGRESS NOTES
This is 69 y.o.  male with who has been treated for Postherpetic neuralgia . Pain is  managed with lyrica  , The pain is described as  burning   and is relieved by Medications    with who is here for follow-up No chief complaint on file.      Pain Therapies:  lyrica       Jadiel Each Box that Applies Female Male   FAMILY HISTORY OF SUBSTANCE ABUSE  Jadiel the boxes that applies   Alcohol ?  1    x 3   Illegal drugs ?  2 ? 3   Rx drugs ?  4 ? 4   PERSONAL HISTORY OF SUBSTNACE ABUSE   Alcohol ?  3 ?  3   Illegal drugs ?  4 ?  4    Rx drugs ?  5 ?  5   Age Between 16-45 years ?  1 ?  1   History of Preadolescent Sexual Abuse ?  3 ?  0   PSYCHOLOGIC DISEASE   ADD, OCD, bipolar, schizophrenia ?  2 ?  2   Depression ?  1 ?  1   Scoring Totals  3     Scoring (Risk)  0-3 - Low  4-7 - Moderate  8 - High    Opioid Risk Assessment Score 3/26

## 2024-12-27 DIAGNOSIS — B02.29 POSTHERPETIC NEURALGIA: ICD-10-CM

## 2024-12-27 RX ORDER — PREGABALIN 150 MG/1
150 CAPSULE ORAL 2 TIMES DAILY
Qty: 180 CAPSULE | Refills: 0 | Status: CANCELLED | OUTPATIENT
Start: 2024-12-27 | End: 2025-03-27

## 2025-01-01 DIAGNOSIS — B02.29 POSTHERPETIC NEURALGIA: ICD-10-CM

## 2025-01-01 RX ORDER — PREGABALIN 150 MG/1
150 CAPSULE ORAL 2 TIMES DAILY
Qty: 180 CAPSULE | Refills: 0 | Status: SHIPPED | OUTPATIENT
Start: 2025-01-01 | End: 2025-04-01

## 2025-02-03 NOTE — PROGRESS NOTES
This is 69 y.o.  male with who has been treated for Postherpetic neuralgia . Pain is  managed with medications  , The pain is described as sharp and is relieved by Medications    with who is here for follow-up   Chief Complaint   Patient presents with    Follow-up    Med Management    Med Refill     Postherpetic neuralgia , Right Shoulder        Pain Therapies:  three month follow up , medication management .

## 2025-02-28 ENCOUNTER — OFFICE VISIT (OUTPATIENT)
Dept: PRIMARY CARE | Facility: CLINIC | Age: 70
End: 2025-02-28
Payer: COMMERCIAL

## 2025-02-28 VITALS — OXYGEN SATURATION: 98 % | DIASTOLIC BLOOD PRESSURE: 68 MMHG | HEART RATE: 52 BPM | SYSTOLIC BLOOD PRESSURE: 134 MMHG

## 2025-02-28 DIAGNOSIS — B02.9 HERPES ZOSTER WITHOUT COMPLICATION: Primary | ICD-10-CM

## 2025-02-28 PROCEDURE — 1036F TOBACCO NON-USER: CPT | Performed by: INTERNAL MEDICINE

## 2025-02-28 PROCEDURE — 99213 OFFICE O/P EST LOW 20 MIN: CPT | Performed by: INTERNAL MEDICINE

## 2025-02-28 RX ORDER — VALACYCLOVIR HYDROCHLORIDE 1 G/1
1000 TABLET, FILM COATED ORAL 3 TIMES DAILY
Qty: 21 TABLET | Refills: 0 | Status: SHIPPED | OUTPATIENT
Start: 2025-02-28 | End: 2025-03-07

## 2025-02-28 ASSESSMENT — ENCOUNTER SYMPTOMS
COUGH: 0
FATIGUE: 1
MYALGIAS: 0
FEVER: 0
ARTHRALGIAS: 0
HEADACHES: 0

## 2025-02-28 NOTE — PROGRESS NOTES
"Subjective   Patient ID: Jadiel Waggoner is a 69 y.o. male who presents for Rash (Small area on his L forearm).    Same-day visit    Patient has had herpes zoster twice in the past, 20 years ago involving the right cervical dermatome with resulting postherpetic neuralgia for which he is still on Lyrica  Second episode was more recently perhaps 2 years ago involving the left cervical dermatome.      Overnight, he developed \"blister\", possible vesicle, on left forearm.  He denies any significant neuropathic pain such as tingling, burning, lancinating pain involving the left arm.  He does feel a bit malaised.  No fever.         Review of Systems   Constitutional:  Positive for fatigue. Negative for fever.   Respiratory:  Negative for cough.    Musculoskeletal:  Negative for arthralgias and myalgias.   Skin:  Positive for rash.   Neurological:  Negative for headaches.       Objective   /68 (BP Location: Right arm, Patient Position: Sitting)   Pulse 52   SpO2 98%     Physical Exam  Vitals reviewed.   Cardiovascular:      Rate and Rhythm: Normal rate and regular rhythm.   Pulmonary:      Effort: Pulmonary effort is normal.      Breath sounds: Normal breath sounds.   Skin:     Comments: Left forearm with possible unroofed vesicle with erythematous base, perhaps 0.5 cm in diameter.  No other skin lesions seen.         Assessment/Plan     Suspected herpes zoster left upper extremity (cervical dermatome)  Valacyclovir 1000 mg 3 times a day for 7 days  Monitor expectantly  Continue Lyrica (taken for previous episode of herpes zoster).    Follow-up  As needed/as scheduled    Vimal Silvestre MD  "

## 2025-02-28 NOTE — PATIENT INSTRUCTIONS
Suspected herpes zoster left upper extremity (cervical dermatome)  Valacyclovir 1000 mg 3 times a day for 7 days  Monitor expectantly  Continue Lyrica (taken for previous episode of herpes zoster).    Follow-up  As needed/as scheduled

## 2025-03-26 NOTE — PROGRESS NOTES
SUBJECTIVE:  This is 69 y.o.  male with PMH of Postherpetic neuralgia of the right chest and armpit that treated with pregabalin 150 mg twice a day and has been stable on it for many years.  The patient developed rash the last time and I gave him acyclovir to have at home in case that anytime rash happened to take it.  So far there is no more rash.  He is doing very good.  No side effects from the medication and he is staying very active and very involved with his health and his physical fitness.  Refill for his pregabalin ordered today for 3 months.    Prior office visit:  12/4/2024: This is 69 y.o.  male with PMH of Postherpetic neuralgia of the right chest and armpit that treated with pregabalin 150 mg twice a day and has been stable on it for many years.  The patient developed rash the last time and I gave him acyclovir to have at home in case that anytime rash happened to take it.  So far there is no more rash.  He is doing very good.  No side effects from the medication and he is staying very active and very involved with his health and his physical fitness.  who is here for follow-up stating that his medications are working fine everything is good and healthy.  The patient has switched his CPAP to [facility] after his [facility] and he is happy with the switch.  He had cardiac monitor because of high calcium score and he has an ultrasound of his vessels and there were within normal limits                Last procedure:   L5-S1 lumbar transforaminal EMILY for radiculopathy long time ago at [facility]     Portions of record reviewed for pertinent issues: active problem list, medication list, allergies, family history, social history, notes from last encounter, encounters, lab results, imaging and other system records.           I have personally reviewed the OARRS report for this patient. This report is scanned into the electronic medical record. I have considered the risks of abuse, dependence,  addiction and diversion. It showed pregabalin 150 mg twice daily  OPIOID RISK ASSESSMENT SCORE: 0/26  Opioid agreement: 8/7/2024  Activities of daily living: Very active and no side effects from the medication  Adverse effects: None  Analgesia: W/O 8/10, W 0/10  Toxicology screen: 8/7/2024  Aberrant behavior: None  Patient is being treated with pregabalin therapy for pain and is responding appropriately.  There are NO signs of opioid intoxication, abuse, addiction or withdrawal. Pupils are equal, reactive to light bilateral, appropriate speech and cognition. Patient denies any opioid induced constipation. The OAS registry followed periodically, urine toxicology completed and appropriate.      Patient is advised and warned in specific detail about potential benefits of opioids along with risks and side effects including, but not limited to, dependency, addiction, tolerance, hyperalgesia, anxiety, depression, insomnia, endocrine changes, immunologic disturbances, respiratory depression and death.      Caution advised regarding the use of medications prescribed at this office and specific mention made regarding not to drive or operate heavy machinery if feeling side effects from this the medications. Patient expressed an understanding in regards to these particular concerns.      Discussed non-opioid options including (But no limited), physical wellness, antiinflammatory diet, icing and heating, meditation, relaxation, massage and acupuncture.     We will continue to monitor and adjust medications as needed.                 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     Eyes: Negative.    Respiratory: Negative.     Cardiovascular: Negative.    Gastrointestinal: Negative.    Endocrine: Negative.     Genitourinary: Negative.    Musculoskeletal:  Positive for myalgias.   Skin: Negative.    Neurological:  Positive for numbness.   Hematological: Negative.    Psychiatric/Behavioral: Negative.           Physical Exam  Vitals and nursing note reviewed.   Constitutional:       Appearance: Normal appearance.        HENT:      Head: Normocephalic and atraumatic.      Nose: Nose normal.   Eyes:      Extraocular Movements: Extraocular movements intact.      Conjunctiva/sclera: Conjunctivae normal.      Pupils: Pupils are equal, round, and reactive to light.   Cardiovascular:      Rate and Rhythm: Normal rate and regular rhythm.      Pulses: Normal pulses.      Heart sounds: Normal heart sounds.   Pulmonary:      Effort: Pulmonary effort is normal.      Breath sounds: Normal breath sounds.   Abdominal:      General: Abdomen is flat. Bowel sounds are normal.      Palpations: Abdomen is soft.   Musculoskeletal:         General: Tenderness present.   Skin:     General: Skin is warm.   Neurological:      General: No focal deficit present.      Mental Status: He is alert and oriented to person, place, and time.   Psychiatric:         Mood and Affect: Mood normal.         Behavior: Behavior normal.                  Plan  At least 50% of the visit was involved in the discussion of the options for treatment. We discussed exercises, medication, interventional therapies and surgery. Healthy life style is essential with patient hard work to achieve the wellness. In addition; discussion with the patient and/or family about any of the diagnostic results, impressions and/or recommended diagnostic studies, prognosis, risks and benefits of treatment options, instructions for treatment and/or follow-up, importance of compliance with chosen treatment options, risk-factor reduction, and patient/family education.         Continue self-directed physical therapy with at least daily exercises for minimum of 20-minute,   Refill pregabalin 150 mg  x 2 x 3 months  Healthy lifestyle and anti-inflammatory diet in addition to weight control discussed with the patient  Alternative chronic pain therapies was discussed, encouraged and information was handed  Return to Clinic 3 months     *Please note this report has been produced using speech recognition software and may contain errors related to that system including grammar, punctuation and spelling as well as words and phrases that may be inappropriate. If there are questions or concerns, please feel free to contact me to clarify.    Bharati Brannon MD

## 2025-04-02 ENCOUNTER — OFFICE VISIT (OUTPATIENT)
Dept: PAIN MEDICINE | Facility: CLINIC | Age: 70
End: 2025-04-02
Payer: COMMERCIAL

## 2025-04-02 VITALS
WEIGHT: 201 LBS | BODY MASS INDEX: 28.14 KG/M2 | RESPIRATION RATE: 16 BRPM | TEMPERATURE: 97 F | HEIGHT: 71 IN | SYSTOLIC BLOOD PRESSURE: 116 MMHG | HEART RATE: 48 BPM | DIASTOLIC BLOOD PRESSURE: 56 MMHG | OXYGEN SATURATION: 97 %

## 2025-04-02 DIAGNOSIS — B02.29 POSTHERPETIC NEURALGIA: ICD-10-CM

## 2025-04-02 PROCEDURE — 99214 OFFICE O/P EST MOD 30 MIN: CPT | Performed by: ANESTHESIOLOGY

## 2025-04-02 PROCEDURE — 1159F MED LIST DOCD IN RCRD: CPT | Performed by: ANESTHESIOLOGY

## 2025-04-02 PROCEDURE — 3008F BODY MASS INDEX DOCD: CPT | Performed by: ANESTHESIOLOGY

## 2025-04-02 PROCEDURE — 1125F AMNT PAIN NOTED PAIN PRSNT: CPT | Performed by: ANESTHESIOLOGY

## 2025-04-02 RX ORDER — PREGABALIN 150 MG/1
150 CAPSULE ORAL 2 TIMES DAILY
Qty: 180 CAPSULE | Refills: 0 | Status: SHIPPED | OUTPATIENT
Start: 2025-04-02 | End: 2025-07-01

## 2025-04-02 ASSESSMENT — ENCOUNTER SYMPTOMS
DEPRESSION: 0
OCCASIONAL FEELINGS OF UNSTEADINESS: 0
LOSS OF SENSATION IN FEET: 0

## 2025-04-02 ASSESSMENT — PAIN - FUNCTIONAL ASSESSMENT: PAIN_FUNCTIONAL_ASSESSMENT: 0-10

## 2025-04-02 ASSESSMENT — PAIN SCALES - GENERAL
PAINLEVEL_OUTOF10: 4
PAINLEVEL_OUTOF10: 4

## 2025-04-02 ASSESSMENT — PAIN DESCRIPTION - DESCRIPTORS: DESCRIPTORS: ACHING

## 2025-04-30 ENCOUNTER — LAB (OUTPATIENT)
Dept: LAB | Facility: HOSPITAL | Age: 70
End: 2025-04-30
Payer: COMMERCIAL

## 2025-04-30 DIAGNOSIS — E55.9 VITAMIN D DEFICIENCY: ICD-10-CM

## 2025-04-30 DIAGNOSIS — D69.6 THROMBOCYTOPENIA (CMS-HCC): ICD-10-CM

## 2025-04-30 DIAGNOSIS — D69.6 THROMBOCYTOPENIA, UNSPECIFIED (CMS-HCC): Primary | ICD-10-CM

## 2025-04-30 LAB
25(OH)D3 SERPL-MCNC: 47 NG/ML (ref 30–100)
BASOPHILS # BLD AUTO: 0.05 X10*3/UL (ref 0–0.1)
BASOPHILS NFR BLD AUTO: 1 %
EOSINOPHIL # BLD AUTO: 0.15 X10*3/UL (ref 0–0.7)
EOSINOPHIL NFR BLD AUTO: 2.9 %
ERYTHROCYTE [DISTWIDTH] IN BLOOD BY AUTOMATED COUNT: 13.7 % (ref 11.5–14.5)
HCT VFR BLD AUTO: 43.5 % (ref 41–52)
HGB BLD-MCNC: 14 G/DL (ref 13.5–17.5)
IMM GRANULOCYTES # BLD AUTO: 0.01 X10*3/UL (ref 0–0.7)
IMM GRANULOCYTES NFR BLD AUTO: 0.2 % (ref 0–0.9)
LYMPHOCYTES # BLD AUTO: 1.68 X10*3/UL (ref 1.2–4.8)
LYMPHOCYTES NFR BLD AUTO: 32.1 %
MCH RBC QN AUTO: 30 PG (ref 26–34)
MCHC RBC AUTO-ENTMCNC: 32.2 G/DL (ref 32–36)
MCV RBC AUTO: 93 FL (ref 80–100)
MONOCYTES # BLD AUTO: 0.61 X10*3/UL (ref 0.1–1)
MONOCYTES NFR BLD AUTO: 11.7 %
NEUTROPHILS # BLD AUTO: 2.73 X10*3/UL (ref 1.2–7.7)
NEUTROPHILS NFR BLD AUTO: 52.1 %
NRBC BLD-RTO: 0 /100 WBCS (ref 0–0)
PLATELET # BLD AUTO: 109 X10*3/UL (ref 150–450)
RBC # BLD AUTO: 4.66 X10*6/UL (ref 4.5–5.9)
WBC # BLD AUTO: 5.2 X10*3/UL (ref 4.4–11.3)

## 2025-04-30 PROCEDURE — 85025 COMPLETE CBC W/AUTO DIFF WBC: CPT

## 2025-04-30 PROCEDURE — 36415 COLL VENOUS BLD VENIPUNCTURE: CPT

## 2025-04-30 PROCEDURE — 82306 VITAMIN D 25 HYDROXY: CPT

## 2025-05-03 ENCOUNTER — PATIENT MESSAGE (OUTPATIENT)
Dept: PRIMARY CARE | Facility: CLINIC | Age: 70
End: 2025-05-03
Payer: COMMERCIAL

## 2025-05-03 DIAGNOSIS — Z00.00 WELLNESS EXAMINATION: Primary | ICD-10-CM

## 2025-07-02 ENCOUNTER — OFFICE VISIT (OUTPATIENT)
Dept: PAIN MEDICINE | Facility: CLINIC | Age: 70
End: 2025-07-02
Payer: COMMERCIAL

## 2025-07-02 VITALS
BODY MASS INDEX: 28 KG/M2 | DIASTOLIC BLOOD PRESSURE: 68 MMHG | SYSTOLIC BLOOD PRESSURE: 147 MMHG | WEIGHT: 200 LBS | HEART RATE: 46 BPM | TEMPERATURE: 96.1 F | HEIGHT: 71 IN | RESPIRATION RATE: 18 BRPM | OXYGEN SATURATION: 98 %

## 2025-07-02 DIAGNOSIS — B02.29 POSTHERPETIC NEURALGIA: ICD-10-CM

## 2025-07-02 PROCEDURE — 1159F MED LIST DOCD IN RCRD: CPT | Performed by: ANESTHESIOLOGY

## 2025-07-02 PROCEDURE — 99214 OFFICE O/P EST MOD 30 MIN: CPT | Performed by: ANESTHESIOLOGY

## 2025-07-02 PROCEDURE — 3008F BODY MASS INDEX DOCD: CPT | Performed by: ANESTHESIOLOGY

## 2025-07-02 PROCEDURE — 1125F AMNT PAIN NOTED PAIN PRSNT: CPT | Performed by: ANESTHESIOLOGY

## 2025-07-02 RX ORDER — PREGABALIN 150 MG/1
150 CAPSULE ORAL 2 TIMES DAILY
Qty: 180 CAPSULE | Refills: 0 | Status: SHIPPED | OUTPATIENT
Start: 2025-07-02 | End: 2025-09-30

## 2025-07-02 ASSESSMENT — ENCOUNTER SYMPTOMS
DEPRESSION: 0
OCCASIONAL FEELINGS OF UNSTEADINESS: 0
LOSS OF SENSATION IN FEET: 0

## 2025-07-02 ASSESSMENT — PAIN SCALES - GENERAL
PAINLEVEL_OUTOF10: 3
PAINLEVEL_OUTOF10: 3

## 2025-07-02 ASSESSMENT — PAIN - FUNCTIONAL ASSESSMENT: PAIN_FUNCTIONAL_ASSESSMENT: 0-10

## 2025-07-02 ASSESSMENT — PAIN DESCRIPTION - DESCRIPTORS: DESCRIPTORS: SHARP

## 2025-07-02 NOTE — PROGRESS NOTES
SUBJECTIVE:  This is 69 y.o.  male with PMH of Postherpetic neuralgia of the right chest and armpit that treated with pregabalin 150 mg twice a day and has been stable on it for many years.  The patient developed rash the last time and I gave him acyclovir to have at home in case that anytime rash happened to take it.  So far there is no more rash.  He is doing very good.  No side effects from the medication and he is staying very active and very involved with his health and his physical fitness.  Refill for his pregabalin ordered today for 3 months.              Last procedure:   L5-S1 lumbar transforaminal EMILY for radiculopathy long time ago at [facility]     Portions of record reviewed for pertinent issues: active problem list, medication list, allergies, family history, social history, notes from last encounter, encounters, lab results, imaging and other system records.           I have personally reviewed the OARRS report for this patient. This report is scanned into the electronic medical record. I have considered the risks of abuse, dependence, addiction and diversion. It showed pregabalin 150 mg twice daily  OPIOID RISK ASSESSMENT SCORE: 0/26  Opioid agreement: 8/7/2024  Activities of daily living: Very active and no side effects from the medication  Adverse effects: None  Analgesia: W/O 8/10, W 0/10  Toxicology screen: 8/7/2024  Aberrant behavior: None  Patient is being treated with pregabalin therapy for pain and is responding appropriately.  There are NO signs of opioid intoxication, abuse, addiction or withdrawal. Pupils are equal, reactive to light bilateral, appropriate speech and cognition. Patient denies any opioid induced constipation. The OARRS registry followed periodically, urine toxicology completed and appropriate.      Patient is advised and warned in specific detail about potential benefits of opioids along with risks and side effects including, but not limited to, dependency, addiction,  tolerance, hyperalgesia, anxiety, depression, insomnia, endocrine changes, immunologic disturbances, respiratory depression and death.      Caution advised regarding the use of medications prescribed at this office and specific mention made regarding not to drive or operate heavy machinery if feeling side effects from this the medications. Patient expressed an understanding in regards to these particular concerns.      Discussed non-opioid options including (But no limited), physical wellness, antiinflammatory diet, icing and heating, meditation, relaxation, massage and acupuncture.     We will continue to monitor and adjust medications as needed.                 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     Eyes: Negative.    Respiratory: Negative.     Cardiovascular: Negative.    Gastrointestinal: Negative.    Endocrine: Negative.    Genitourinary: Negative.    Musculoskeletal:  Positive for myalgias.   Skin: Negative.    Neurological:  Positive for numbness.   Hematological: Negative.    Psychiatric/Behavioral: Negative.           Physical Exam  Vitals and nursing note reviewed.   Constitutional:       Appearance: Normal appearance.        HENT:      Head: Normocephalic and atraumatic.      Nose: Nose normal.   Eyes:      Extraocular Movements: Extraocular movements intact.      Conjunctiva/sclera: Conjunctivae normal.      Pupils: Pupils are equal, round, and reactive to light.   Cardiovascular:      Rate and Rhythm: Normal rate and regular rhythm.      Pulses: Normal pulses.      Heart sounds: Normal heart sounds.   Pulmonary:      Effort: Pulmonary effort is normal.      Breath sounds: Normal breath sounds.   Abdominal:      General: Abdomen is flat. Bowel sounds are normal.      Palpations:  Abdomen is soft.   Musculoskeletal:         General: Tenderness present.   Skin:     General: Skin is warm.   Neurological:      General: No focal deficit present.      Mental Status: He is alert and oriented to person, place, and time.   Psychiatric:         Mood and Affect: Mood normal.         Behavior: Behavior normal.                                       Plan  At least 50% of the visit was involved in the discussion of the options for treatment. We discussed exercises, medication, interventional therapies and surgery. Healthy life style is essential with patient hard work to achieve the wellness. In addition; discussion with the patient and/or family about any of the diagnostic results, impressions and/or recommended diagnostic studies, prognosis, risks and benefits of treatment options, instructions for treatment and/or follow-up, importance of compliance with chosen treatment options, risk-factor reduction, and patient/family education.         Continue self-directed physical therapy at least daily exercises for minimum of 20-minute  Refill pregabalin 150 mg x 2 daily x 3 months  Healthy lifestyle and anti-inflammatory diet in addition to weight control discussed with the patient  Alternative chronic pain therapies was discussed, encouraged and information was handed  Return to Clinic 3 months     *Please note this report has been produced using speech recognition software and may contain errors related to that system including grammar, punctuation and spelling as well as words and phrases that may be inappropriate. If there are questions or concerns, please feel free to contact me to clarify.    Bharati Brannon MD

## 2025-07-02 NOTE — PROGRESS NOTES
This is 69 y.o.  male with who has been treated for Postherpetic neuralgia . Pain is managed, The pain is described as sharp and is relieved by Medications   . Here for follow-up No chief complaint on file.      Pain Therapies: Medication management Lyrica

## 2025-07-13 DIAGNOSIS — E78.00 PURE HYPERCHOLESTEROLEMIA: ICD-10-CM

## 2025-07-15 RX ORDER — EZETIMIBE 10 MG/1
10 TABLET ORAL DAILY
Qty: 90 TABLET | Refills: 3 | Status: SHIPPED | OUTPATIENT
Start: 2025-07-15

## 2025-07-21 ENCOUNTER — TELEPHONE (OUTPATIENT)
Dept: CARDIOLOGY | Facility: HOSPITAL | Age: 70
End: 2025-07-21
Payer: COMMERCIAL

## 2025-07-21 NOTE — TELEPHONE ENCOUNTER
Patient called saying he has an apointment with Dr. Pérez on 7/30 and wants to get blood work done prior. He said you can send a [employer] message with the information once the orders are placed.     Thank you.

## 2025-07-30 ENCOUNTER — OFFICE VISIT (OUTPATIENT)
Dept: CARDIOLOGY | Facility: HOSPITAL | Age: 70
End: 2025-07-30
Payer: COMMERCIAL

## 2025-07-30 ENCOUNTER — HOSPITAL ENCOUNTER (OUTPATIENT)
Dept: CARDIOLOGY | Facility: HOSPITAL | Age: 70
Discharge: HOME | End: 2025-07-30
Payer: COMMERCIAL

## 2025-07-30 VITALS
DIASTOLIC BLOOD PRESSURE: 74 MMHG | BODY MASS INDEX: 26.67 KG/M2 | OXYGEN SATURATION: 98 % | HEART RATE: 50 BPM | HEIGHT: 72 IN | RESPIRATION RATE: 18 BRPM | WEIGHT: 196.9 LBS | SYSTOLIC BLOOD PRESSURE: 135 MMHG

## 2025-07-30 DIAGNOSIS — I25.10 ATHEROSCLEROSIS OF NATIVE CORONARY ARTERY OF NATIVE HEART WITHOUT ANGINA PECTORIS: Primary | ICD-10-CM

## 2025-07-30 DIAGNOSIS — I25.10 ATHEROSCLEROSIS OF NATIVE CORONARY ARTERY OF NATIVE HEART WITHOUT ANGINA PECTORIS: ICD-10-CM

## 2025-07-30 DIAGNOSIS — E78.00 PURE HYPERCHOLESTEROLEMIA: ICD-10-CM

## 2025-07-30 DIAGNOSIS — I77.810 AORTIC ROOT DILATATION: ICD-10-CM

## 2025-07-30 DIAGNOSIS — I71.20 THORACIC AORTIC ANEURYSM, WITHOUT RUPTURE, UNSPECIFIED: ICD-10-CM

## 2025-07-30 LAB
AORTIC VALVE MEAN GRADIENT: 3 MMHG
AORTIC VALVE PEAK VELOCITY: 1.1 M/S
AV PEAK GRADIENT: 5 MMHG
AVA (PEAK VEL): 2.5 CM2
AVA (VTI): 2.33 CM2
EJECTION FRACTION APICAL 4 CHAMBER: 60.9
EJECTION FRACTION: 61 %
LEFT ATRIUM VOLUME AREA LENGTH INDEX BSA: 24.6 ML/M2
LEFT VENTRICLE INTERNAL DIMENSION DIASTOLE: 4.48 CM (ref 3.5–6)
LEFT VENTRICULAR OUTFLOW TRACT DIAMETER: 2 CM
MITRAL VALVE E/A RATIO: 0.88
RIGHT VENTRICLE FREE WALL PEAK S': 12 CM/S
TRICUSPID ANNULAR PLANE SYSTOLIC EXCURSION: 2.6 CM

## 2025-07-30 PROCEDURE — 3008F BODY MASS INDEX DOCD: CPT | Performed by: INTERNAL MEDICINE

## 2025-07-30 PROCEDURE — 1160F RVW MEDS BY RX/DR IN RCRD: CPT | Performed by: INTERNAL MEDICINE

## 2025-07-30 PROCEDURE — 99212 OFFICE O/P EST SF 10 MIN: CPT | Mod: 25

## 2025-07-30 PROCEDURE — 93306 TTE W/DOPPLER COMPLETE: CPT

## 2025-07-30 PROCEDURE — 1159F MED LIST DOCD IN RCRD: CPT | Performed by: INTERNAL MEDICINE

## 2025-07-30 PROCEDURE — G2211 COMPLEX E/M VISIT ADD ON: HCPCS | Performed by: INTERNAL MEDICINE

## 2025-07-30 PROCEDURE — 99214 OFFICE O/P EST MOD 30 MIN: CPT | Performed by: INTERNAL MEDICINE

## 2025-07-30 PROCEDURE — 93306 TTE W/DOPPLER COMPLETE: CPT | Performed by: INTERNAL MEDICINE

## 2025-07-30 NOTE — PROGRESS NOTES
Primary Care Physician: Vimal Silvestre MD  Date of Visit: 07/30/2025  9:00 AM EDT  Location of visit: [facility]     Chief Complaint:   Chief Complaint   Patient presents with    Follow-up        HPI / Summary:   Jadiel Waggoner is a 70 y.o. male who presents for followup.  He has no cardiac complaints.  He walks up to 3 miles 4 days a week in addition to working out with a  4 days a week without chest pain or shortness of breath.  He has had no change in his exertional tolerance.  The patient denies chest pain, shortness of breath, palpitations, lightheadedness, syncope, orthopnea, paroxysmal nocturnal dyspnea, lower extremity edema, or bleeding problems.    He monitors his blood pressure at home regularly and his blood pressure is usually in the 120s over 60s.        No past medical history on file.     Past Surgical History:   Procedure Laterality Date    CATARACT EXTRACTION Right     CT ANGIO CORONARY ART WITH HEARTFLOW IF SCORE >30%  10/26/2017    CT HEART CORONARY ANGIOGRAM 10/26/2017 [facility]    HERNIA REPAIR Right 2016    [place] with Dr. Summers    TONSILLECTOMY            Social History:   reports that he quit smoking about 28 years ago. His smoking use included cigars and cigarettes. He started smoking about 48 years ago. He has never used smokeless tobacco. He reports current alcohol use of about 2.0 standard drinks of alcohol per week. He reports that he does not use drugs.     Family History:  family history includes Anxiety/depression in his brother; Autoimmune disorder in his sister; Benign prostatic hyperplasia in his brother; Breast cancer in his mother; Coronary artery disease in his father; Depression in his sister; Heart attack in his maternal grandfather and paternal grandfather; Hypothyroidism in his mother, sister, sister, and sister; Lupus in his sister; Overweight in his sister; Pneumonia in his mother; Sarcoidosis in his sister; Skin cancer in his mother; Ulcerative  colitis in his son; overactive bladder in his son.      Allergies:  No Known Allergies    Outpatient Medications:  Current Outpatient Medications   Medication Instructions    aspirin 81 mg EC tablet 1 tablet, Daily    cholecalciferol (VITAMIN D3) 50 mcg, oral, Daily    ezetimibe (ZETIA) 10 mg, oral, Daily    pregabalin (LYRICA) 150 mg, oral, 2 times daily, Please refill today    rosuvastatin (CRESTOR) 40 mg, oral, Nightly    sildenafil (Viagra) 100 mg tablet Take 1/2 to 1 tablet by mouth 30-60 minutes prior to sexual activity as needed       Physical Exam:  Vitals:    07/30/25 0901   BP: 135/74   BP Location: Left arm   Patient Position: Sitting   BP Cuff Size: Adult   Pulse: 50   Resp: 18   SpO2: 98%   Weight: 89.3 kg (196 lb 14.4 oz)   Height: 1.829 m (6')       Wt Readings from Last 5 Encounters:   07/30/25 89.3 kg (196 lb 14.4 oz)   07/02/25 90.7 kg (200 lb)   04/02/25 91.2 kg (201 lb)   12/04/24 91.2 kg (201 lb)   10/31/24 91.2 kg (201 lb)     Body mass index is 26.7 kg/m².   General: Well-developed well-nourished in no acute distress  HEENT: Normocephalic atraumatic  Neck: Supple, JVP is normal negative hepatojugular reflux 2+ carotid pulses without bruit  Pulmonary: Normal respiratory effort, clear to auscultation  Cardiovascular: No right ventricular heave, normal S1 and S2, no murmurs rubs or gallops  Abdomen: Soft nontender nondistended  Extremities: Warm without edema 2+ radial pulses bilaterally 2+ posterior tibial pulses bilaterally  Neurologic: Alert and oriented x3  Psychiatric: Normal mood and affect     Last Labs:  CMP:  Recent Labs     10/24/24  0815 11/20/23  0757 11/28/22  0841    142 142   K 4.0 4.2 4.3    105 107   CO2 30 30 29   ANIONGAP 11 11 10   BUN 8 11 13   CREATININE 0.79 0.84 0.85   EGFR >90 >90  --    GLUCOSE 92 83 86     Recent Labs     10/24/24  0815 11/20/23  0757 11/28/22  0841   ALBUMIN 4.3 4.2 4.1   ALKPHOS 44 46 55   ALT 17 16 14   AST 20 18 19   BILITOT 0.6 0.7 0.7  "    CBC:  Recent Labs     04/30/25  0755 10/24/24  0815 11/20/23  0757   WBC 5.2 5.2 5.0   HGB 14.0 14.4 13.9   HCT 43.5 44.2 42.9   * 124* 115*   MCV 93 93 93     COAG: No results for input(s): \"INR\", \"DDIMERVTE\" in the last 53822 hours.  HEME/ENDO:  Recent Labs     10/24/24  0815 11/20/23  0757 01/04/23  1011   TSH 3.06 3.17  --    HGBA1C 5.6  --  5.5      CARDIAC: No results for input(s): \"LDH\", \"CKMB\", \"TROPHS\", \"BNP\" in the last 27084 hours.    No lab exists for component: \"CK\", \"CKMBP\"  Recent Labs     10/24/24  0815 07/16/24  0917 11/20/23  0757 07/12/22  0741 11/10/21  0742 07/02/21  1012   CHOL 116 138 137 130 130 140   LDLF  --   --   --  58 58 71   LDLCALC 42 57 59  --   --   --    HDL 60.6 68.9 65.4 58.6 58.3 58.6   TRIG 68 61 62 68 67 52       Last Cardiology Tests:  ECG:  Electrocardiogram performed October 31, 2024 that I reviewed showed sinus bradycardia and is otherwise normal.    Holter monitor December 2024  * The predominant rhythm was Sinus. * The Maximum Heart Rate recorded was 144 bpm, 11/ 26 13: 57: 22, the Minimum Heart Rate recorded was 38 bpm, 11/ 28 03: 39: 13, and the Average Heart Rate was 53 bpm. * There were 12, 292 VE beats with a burden of 2 % . * There were 5, 466 SVE beats with a burden of < 1 % . There were 5 occurrences of Supraventricular Tachycardia with the Fastest episode 144 bpm, 11/ 26 13: 57: 22, and the Longest episode 7 beats, 11/ 22 15: 57: 55. * Other rhythms in this study include: Junctional Escape Rhythm, Accelerated Junctional Rhythm, Ventricular Run. * There were 2 Patient Triggers.    Echo:  Echocardiogram July 17, 2024  CONCLUSIONS:   1. Left ventricular ejection fraction is normal, calculated by Lazo's biplane at 61%.   2. There is normal right ventricular global systolic function.   3. RVSP within normal limits.   4. Mildly dilated aortic root a 3.8cm.    Echocardiogram July 19, 2023  CONCLUSIONS:  1. Left ventricular systolic function is normal with " a 55% estimated ejection fraction.  2. Stable mild dilation of aortic root at 3.8 cm.       Cath:      Stress Test:  Exercise stress echo May 11, 2016  The patient exercised to stage IV on a Eagle protocol for 9 minutes and 55 seconds, achieving 10.8 METS.   Summary:   1. Normal exercise stess echocardiogram at an above average workload.   2. Normal global left ventricular systolic function.   3. The resting ejection fraction was estimated at 60 to 65% with a peak exercise ejection fraction estimated at >75%.   4. The adequate level of stress was achieved.         Cardiac Imaging:  Abdominal aortic ultrasound December 2, 2024  CONCLUSIONS:  Aorta/Common Iliac Arteries/IVC: No evidence of abdominal aortic aneurysm in this screening examination.    CT coronary angiogram with heart flow October 2017  AORTIC VALVE:  The aortic valve is trileaflet in morphology. No calcifications. The  aortic root is mildly dilated measuring 4.1 x 3.8 x 3.6 cm at the  level of the aortic sinuses. The sino-tubular junction measures 3.3 x  3.3 cm. The ascending thoracic aorta at the proximal level measures  3.5 by 3.3 cm.  IMPRESSION:  1. Right dominant coronary circulation.  2. Luminal irregularities in proximal LAD, circumflex, and right  coronary artery. No obstructive disease noted.  3. Aortic root dilatation measuring 4.1 x 3.8 x 3.6 cm at the level  of the aortic sinuses.    CT cardiac scoring April 26, 2016  IMPRESSION:     Coronary artery calcium score utilizing the method of Agatston is 470.        Assessment/Plan   Diagnoses and all orders for this visit:  Atherosclerosis of native coronary artery of native heart without angina pectoris  Aortic root dilatation  -     Transthoracic echo (TTE) complete; Future  -     perflutren lipid microspheres (Definity) injection 0.5-10 mL of dilution  -     sulfur hexafluoride microsphr (Lumason) injection 24.28 mg  -     perflutren protein A microsphere (Optison) injection 0.5 mL  -      Insert and maintain peripheral IV; Standing  Pure hypercholesterolemia      In summary Mr. Waggoner is a pleasant 70 year-old white male with a past medical history significant for coronary atherosclerosis with a CT calcium score of 470 in 2016 and nonobstructive disease on CT angiography with preserved LV function, hyperlipidemia, mild dilation of his aortic root on CT, and sarcoidosis.  He is asymptomatic from a cardiac perspective.  His blood pressure today is mildly elevated but his home readings are normal.  I asked him to continue to monitor his blood pressure at home.  His recent lipid profile was at goal.  His echocardiogram today shows stable mild aortic root dilation.  I encouraged him to continue to exercise.  He should continue his current cardiovascular medications.  I will see him back in follow-up in 1 year with an echo for surveillance of his aorta.    Orders:  Orders Placed This Encounter   Procedures    Transthoracic echo (TTE) complete      Followup Appts:  Future Appointments   Date Time Provider Department Center   9/22/2025  4:00 PM Bharati Brannon MD [facility] [place]   11/3/2025  1:00 PM Vimla Silvestre MD [id number] [place]           ____________________________________________________________  Lai Pérez MD  [facility]  [facility]

## 2025-08-06 ENCOUNTER — APPOINTMENT (OUTPATIENT)
Dept: CARDIOLOGY | Facility: HOSPITAL | Age: 70
End: 2025-08-06
Payer: COMMERCIAL

## 2025-10-01 ENCOUNTER — APPOINTMENT (OUTPATIENT)
Dept: PAIN MEDICINE | Facility: CLINIC | Age: 70
End: 2025-10-01
Payer: COMMERCIAL

## 2025-11-03 ENCOUNTER — APPOINTMENT (OUTPATIENT)
Dept: PRIMARY CARE | Facility: CLINIC | Age: 70
End: 2025-11-03
Payer: MEDICARE